# Patient Record
Sex: MALE | Race: WHITE | HISPANIC OR LATINO | Employment: UNEMPLOYED | URBAN - METROPOLITAN AREA
[De-identification: names, ages, dates, MRNs, and addresses within clinical notes are randomized per-mention and may not be internally consistent; named-entity substitution may affect disease eponyms.]

---

## 2024-03-12 ENCOUNTER — APPOINTMENT (OUTPATIENT)
Dept: RADIOLOGY | Facility: MEDICAL CENTER | Age: 44
DRG: 040 | End: 2024-03-12
Attending: NEUROLOGICAL SURGERY
Payer: COMMERCIAL

## 2024-03-12 ENCOUNTER — HOSPITAL ENCOUNTER (INPATIENT)
Facility: MEDICAL CENTER | Age: 44
LOS: 6 days | DRG: 040 | End: 2024-03-18
Attending: STUDENT IN AN ORGANIZED HEALTH CARE EDUCATION/TRAINING PROGRAM | Admitting: STUDENT IN AN ORGANIZED HEALTH CARE EDUCATION/TRAINING PROGRAM
Payer: COMMERCIAL

## 2024-03-12 ENCOUNTER — HOSPITAL ENCOUNTER (OUTPATIENT)
Dept: RADIOLOGY | Facility: MEDICAL CENTER | Age: 44
End: 2024-03-12
Attending: EMERGENCY MEDICINE

## 2024-03-12 ENCOUNTER — APPOINTMENT (OUTPATIENT)
Dept: RADIOLOGY | Facility: MEDICAL CENTER | Age: 44
DRG: 040 | End: 2024-03-12
Attending: STUDENT IN AN ORGANIZED HEALTH CARE EDUCATION/TRAINING PROGRAM
Payer: COMMERCIAL

## 2024-03-12 DIAGNOSIS — I60.9 SAH (SUBARACHNOID HEMORRHAGE) (HCC): ICD-10-CM

## 2024-03-12 DIAGNOSIS — G93.89 BRAIN MASS: ICD-10-CM

## 2024-03-12 PROBLEM — R53.1 WEAKNESS: Status: ACTIVE | Noted: 2024-03-12

## 2024-03-12 PROBLEM — R20.8 DECREASED SENSATION: Status: ACTIVE | Noted: 2024-03-12

## 2024-03-12 LAB
ALBUMIN SERPL BCP-MCNC: 4.3 G/DL (ref 3.2–4.9)
ALBUMIN/GLOB SERPL: 1.4 G/DL
ALP SERPL-CCNC: 109 U/L (ref 30–99)
ALT SERPL-CCNC: 77 U/L (ref 2–50)
ANION GAP SERPL CALC-SCNC: 13 MMOL/L (ref 7–16)
AST SERPL-CCNC: 81 U/L (ref 12–45)
BASOPHILS # BLD AUTO: 1.5 % (ref 0–1.8)
BASOPHILS # BLD: 0.13 K/UL (ref 0–0.12)
BILIRUB SERPL-MCNC: 0.6 MG/DL (ref 0.1–1.5)
BUN SERPL-MCNC: 10 MG/DL (ref 8–22)
CALCIUM ALBUM COR SERPL-MCNC: 8.6 MG/DL (ref 8.5–10.5)
CALCIUM SERPL-MCNC: 8.8 MG/DL (ref 8.5–10.5)
CHLORIDE SERPL-SCNC: 99 MMOL/L (ref 96–112)
CO2 SERPL-SCNC: 24 MMOL/L (ref 20–33)
CREAT SERPL-MCNC: 0.99 MG/DL (ref 0.5–1.4)
EOSINOPHIL # BLD AUTO: 0.27 K/UL (ref 0–0.51)
EOSINOPHIL NFR BLD: 3.2 % (ref 0–6.9)
ERYTHROCYTE [DISTWIDTH] IN BLOOD BY AUTOMATED COUNT: 45.6 FL (ref 35.9–50)
GFR SERPLBLD CREATININE-BSD FMLA CKD-EPI: 98 ML/MIN/1.73 M 2
GLOBULIN SER CALC-MCNC: 3 G/DL (ref 1.9–3.5)
GLUCOSE SERPL-MCNC: 94 MG/DL (ref 65–99)
HCT VFR BLD AUTO: 48.3 % (ref 42–52)
HGB BLD-MCNC: 16.5 G/DL (ref 14–18)
IMM GRANULOCYTES # BLD AUTO: 0.1 K/UL (ref 0–0.11)
IMM GRANULOCYTES NFR BLD AUTO: 1.2 % (ref 0–0.9)
LYMPHOCYTES # BLD AUTO: 2.73 K/UL (ref 1–4.8)
LYMPHOCYTES NFR BLD: 32.5 % (ref 22–41)
MCH RBC QN AUTO: 31.4 PG (ref 27–33)
MCHC RBC AUTO-ENTMCNC: 34.2 G/DL (ref 32.3–36.5)
MCV RBC AUTO: 92 FL (ref 81.4–97.8)
MONOCYTES # BLD AUTO: 0.61 K/UL (ref 0–0.85)
MONOCYTES NFR BLD AUTO: 7.3 % (ref 0–13.4)
NEUTROPHILS # BLD AUTO: 4.57 K/UL (ref 1.82–7.42)
NEUTROPHILS NFR BLD: 54.3 % (ref 44–72)
NRBC # BLD AUTO: 0 K/UL
NRBC BLD-RTO: 0 /100 WBC (ref 0–0.2)
PLATELET # BLD AUTO: 291 K/UL (ref 164–446)
PMV BLD AUTO: 9.8 FL (ref 9–12.9)
POTASSIUM SERPL-SCNC: 3.7 MMOL/L (ref 3.6–5.5)
PROT SERPL-MCNC: 7.3 G/DL (ref 6–8.2)
RBC # BLD AUTO: 5.25 M/UL (ref 4.7–6.1)
SODIUM SERPL-SCNC: 136 MMOL/L (ref 135–145)
WBC # BLD AUTO: 8.4 K/UL (ref 4.8–10.8)

## 2024-03-12 PROCEDURE — 36415 COLL VENOUS BLD VENIPUNCTURE: CPT

## 2024-03-12 PROCEDURE — 99222 1ST HOSP IP/OBS MODERATE 55: CPT | Performed by: STUDENT IN AN ORGANIZED HEALTH CARE EDUCATION/TRAINING PROGRAM

## 2024-03-12 PROCEDURE — 85025 COMPLETE CBC W/AUTO DIFF WBC: CPT

## 2024-03-12 PROCEDURE — 770004 HCHG ROOM/CARE - ONCOLOGY PRIVATE *

## 2024-03-12 PROCEDURE — 80053 COMPREHEN METABOLIC PANEL: CPT

## 2024-03-12 PROCEDURE — A9270 NON-COVERED ITEM OR SERVICE: HCPCS | Performed by: STUDENT IN AN ORGANIZED HEALTH CARE EDUCATION/TRAINING PROGRAM

## 2024-03-12 PROCEDURE — 700117 HCHG RX CONTRAST REV CODE 255: Performed by: STUDENT IN AN ORGANIZED HEALTH CARE EDUCATION/TRAINING PROGRAM

## 2024-03-12 PROCEDURE — 71260 CT THORAX DX C+: CPT

## 2024-03-12 PROCEDURE — 700105 HCHG RX REV CODE 258: Performed by: STUDENT IN AN ORGANIZED HEALTH CARE EDUCATION/TRAINING PROGRAM

## 2024-03-12 PROCEDURE — 70553 MRI BRAIN STEM W/O & W/DYE: CPT

## 2024-03-12 PROCEDURE — A9579 GAD-BASE MR CONTRAST NOS,1ML: HCPCS | Performed by: NEUROLOGICAL SURGERY

## 2024-03-12 PROCEDURE — 700117 HCHG RX CONTRAST REV CODE 255: Performed by: NEUROLOGICAL SURGERY

## 2024-03-12 PROCEDURE — 700102 HCHG RX REV CODE 250 W/ 637 OVERRIDE(OP): Performed by: STUDENT IN AN ORGANIZED HEALTH CARE EDUCATION/TRAINING PROGRAM

## 2024-03-12 RX ORDER — DIAZEPAM 5 MG/ML
5 INJECTION, SOLUTION INTRAMUSCULAR; INTRAVENOUS ONCE
Status: DISCONTINUED | OUTPATIENT
Start: 2024-03-12 | End: 2024-03-12

## 2024-03-12 RX ORDER — SODIUM CHLORIDE 9 MG/ML
INJECTION, SOLUTION INTRAVENOUS CONTINUOUS
Status: ACTIVE | OUTPATIENT
Start: 2024-03-12 | End: 2024-03-12

## 2024-03-12 RX ORDER — LORAZEPAM 1 MG/1
0.5 TABLET ORAL 2 TIMES DAILY PRN
Status: DISCONTINUED | OUTPATIENT
Start: 2024-03-13 | End: 2024-03-18

## 2024-03-12 RX ORDER — ACETAMINOPHEN 325 MG/1
650 TABLET ORAL EVERY 4 HOURS PRN
Status: ON HOLD | COMMUNITY
End: 2024-03-18

## 2024-03-12 RX ORDER — ACETAMINOPHEN 325 MG/1
650 TABLET ORAL EVERY 4 HOURS PRN
Status: DISCONTINUED | OUTPATIENT
Start: 2024-03-12 | End: 2024-03-14

## 2024-03-12 RX ORDER — HYDROCODONE BITARTRATE AND ACETAMINOPHEN 5; 325 MG/1; MG/1
1 TABLET ORAL EVERY 6 HOURS PRN
Status: DISCONTINUED | OUTPATIENT
Start: 2024-03-12 | End: 2024-03-14

## 2024-03-12 RX ADMIN — IOHEXOL 100 ML: 350 INJECTION, SOLUTION INTRAVENOUS at 13:00

## 2024-03-12 RX ADMIN — SODIUM CHLORIDE: 9 INJECTION, SOLUTION INTRAVENOUS at 04:00

## 2024-03-12 RX ADMIN — GADOTERIDOL 20 ML: 279.3 INJECTION, SOLUTION INTRAVENOUS at 19:35

## 2024-03-12 RX ADMIN — HYDROCODONE BITARTRATE AND ACETAMINOPHEN 1 TABLET: 5; 325 TABLET ORAL at 19:59

## 2024-03-12 ASSESSMENT — COGNITIVE AND FUNCTIONAL STATUS - GENERAL
MOBILITY SCORE: 21
DAILY ACTIVITIY SCORE: 24
SUGGESTED CMS G CODE MODIFIER MOBILITY: CJ
SUGGESTED CMS G CODE MODIFIER DAILY ACTIVITY: CH
CLIMB 3 TO 5 STEPS WITH RAILING: A LITTLE
MOVING TO AND FROM BED TO CHAIR: A LITTLE
WALKING IN HOSPITAL ROOM: A LITTLE

## 2024-03-12 ASSESSMENT — ENCOUNTER SYMPTOMS
SPEECH CHANGE: 0
SEIZURES: 0
ORTHOPNEA: 0
EYE REDNESS: 0
HEMOPTYSIS: 0
FEVER: 0
INSOMNIA: 0
HALLUCINATIONS: 0
TINGLING: 0
BLURRED VISION: 0
DOUBLE VISION: 0
NECK PAIN: 0
BACK PAIN: 0
FOCAL WEAKNESS: 1
VOMITING: 0
SPUTUM PRODUCTION: 0
NAUSEA: 0
PHOTOPHOBIA: 0
DIZZINESS: 0
DIARRHEA: 0
SINUS PAIN: 0
SENSORY CHANGE: 1
ABDOMINAL PAIN: 0
TREMORS: 0
SHORTNESS OF BREATH: 0
EYE DISCHARGE: 0
NERVOUS/ANXIOUS: 0
FLANK PAIN: 0
EYE PAIN: 0
CHILLS: 0
LOSS OF CONSCIOUSNESS: 0
HEADACHES: 0
PALPITATIONS: 0
WEAKNESS: 1

## 2024-03-12 ASSESSMENT — LIFESTYLE VARIABLES
TOTAL SCORE: 0
DOES PATIENT WANT TO STOP DRINKING: NO
EVER HAD A DRINK FIRST THING IN THE MORNING TO STEADY YOUR NERVES TO GET RID OF A HANGOVER: NO
SUBSTANCE_ABUSE: 0
TOTAL SCORE: 0
AVERAGE NUMBER OF DAYS PER WEEK YOU HAVE A DRINK CONTAINING ALCOHOL: 1
TOTAL SCORE: 0
CONSUMPTION TOTAL: INCOMPLETE
EVER FELT BAD OR GUILTY ABOUT YOUR DRINKING: NO
HAVE PEOPLE ANNOYED YOU BY CRITICIZING YOUR DRINKING: NO
HAVE YOU EVER FELT YOU SHOULD CUT DOWN ON YOUR DRINKING: NO
ALCOHOL_USE: YES

## 2024-03-12 ASSESSMENT — PAIN DESCRIPTION - PAIN TYPE
TYPE: ACUTE PAIN

## 2024-03-12 ASSESSMENT — PATIENT HEALTH QUESTIONNAIRE - PHQ9
SUM OF ALL RESPONSES TO PHQ9 QUESTIONS 1 AND 2: 0
1. LITTLE INTEREST OR PLEASURE IN DOING THINGS: NOT AT ALL
2. FEELING DOWN, DEPRESSED, IRRITABLE, OR HOPELESS: NOT AT ALL

## 2024-03-12 NOTE — CARE PLAN
The patient is Stable - Low risk of patient condition declining or worsening    Shift Goals  Clinical Goals: MRI, CT  Patient Goals: Determine treatment plan    Progress made toward(s) clinical / shift goals:        Problem: Knowledge Deficit - Standard  Goal: Patient and family/care givers will demonstrate understanding of plan of care, disease process/condition, diagnostic tests and medications  Description: Target End Date:  1-3 days or as soon as patient condition allows    Document in Patient Education    1.  Patient and family/caregiver oriented to unit, equipment, visitation policy and means for communicating concern  2.  Complete/review Learning Assessment  3.  Assess knowledge level of disease process/condition, treatment plan, diagnostic tests and medications  4.  Explain disease process/condition, treatment plan, diagnostic tests and medications  Outcome: Progressing  Note: Patient understands the need for his scans and to remain NPO until his exams. Patient will report any new or worsening numbness or tingling.

## 2024-03-12 NOTE — ASSESSMENT & PLAN NOTE
MRI brain: approximately 31 x 22 x 26 cm sized enhancing lesion in the left cerebellopontine cistern with extension into the IAC. There is mass effect upon the abe.   - Neurosurgery following  - Resume regular diet after surgery  - Pain control post operatively as indicated

## 2024-03-12 NOTE — HOSPITAL COURSE
Porfirio Campoverde is a 44 y.o. male who presented 3/12/2024 as a transfer from outside facility for brain mass.  Patient initially presented to [facility] emergency department complaining of 1 week history of numbness and tingling and weakness in his right upper and lower extremity.  He states that over the last few days, has progressively been getting worse.  Patient also endorses headache that has been on and off.  Denies any dizziness or blurred vision.  He does state that he has decreased hearing in his right ear.     CT head was obtained that showed no acute hemorrhage.  There is a suspected extra-axial mass along the left cerebellopontine angle.  Most likely meningioma.  However aggressive processes such as metastatic attic foci not excluded.  Recommending MRI of the brain     CTA head and neck was also performed, still showing a cerebellopontine angle lesion as described in the CT head.  Could possibly represent an acoustic neuroma as well.

## 2024-03-12 NOTE — PROGRESS NOTES
4 Eyes Skin Assessment Completed by Maia Cavazos, RN and Danii Joseph, MAURICIO.    Head WDL  Ears WDL  Nose WDL  Mouth WDL  Neck WDL  Breast/Chest WDL  Shoulder Blades WDL  Spine WDL  (R) Arm/Elbow/Hand WDL  (L) Arm/Elbow/Hand WDL  Abdomen WDL  Groin WDL  Scrotum/Coccyx/Buttocks WDL  (R) Leg Edema  (L) Leg WDL  (R) Heel/Foot/Toe WDL  (L) Heel/Foot/Toe WDL    Devices In Places Nasal Cannula    Interventions In Place N/A    Possible Skin Injury No    Pictures Uploaded Into Epic N/A  Wound Consult Placed N/A  RN Wound Prevention Protocol Ordered No

## 2024-03-12 NOTE — PROGRESS NOTES
[facility] TRIAGE OFFICER DIRECT ADMISSION REPORT  Transferring facility: [facility]  Transferring physician: Dr. Leon  Chief complaint: Weakness numbness tingling  Pertinent history & patient course: 44-year-old man presented to [facility] due to weakness, numbness/tingling, some hearing difficulties.  Mass at the cerebellar/pontine junction.  No midline shaft and no significant weakness.  Needs transfer for MRI and neurosurgery consult.  Further work up or recommendations per triage officer prior to transfer: None  Consultants called prior to transfer and pertinent input from consultants: None  Patient accepted for transfer: Yes  Consultants to be called upon arrival: Neurosurgery after imaging  Admission status: Inpatient.   Floor requested: [facility]  If ICU transfer, name of intensivist case discussed with and pertinent input from critical care: N/A     Please inform the triage officer upon arrival of the patient to [facility] for assignment of a hospitalist to perform admission.      For any question or concerns regarding the care of this patient, please reach out to the assigned hospita

## 2024-03-12 NOTE — H&P
Hospital Medicine History & Physical Note    Date of Service  3/12/2024    Primary Care Physician  No primary care provider on file.    Code Status  Full Code    Chief Complaint  No chief complaint on file.      History of Presenting Illness  Porfirio Campoverde is a 44 y.o. male who presented 3/12/2024 as a transfer from outside facility for brain mass.  Patient initially presented to [facility] emergency department complaining of 1 week history of numbness and tingling and weakness in his right upper and lower extremity.  He states that over the last few days, has progressively been getting worse.  Patient also endorses headache that has been on and off.  Denies any dizziness or blurred vision.  He does state that he has decreased hearing in his right ear.    CT head was obtained that showed no acute hemorrhage.  There is a suspected extra-axial mass along the left cerebellopontine angle.  Most likely meningioma.  However aggressive processes such as metastatic attic foci not excluded.  Recommending MRI of the brain    CTA head and neck was also performed, still showing a cerebellopontine angle lesion as described in the CT head.  Could possibly represent an acoustic neuroma as well.    I discussed the plan of care with patient.    Review of Systems  Review of Systems   Constitutional:  Negative for chills and fever.   HENT:  Positive for hearing loss. Negative for congestion, ear discharge, ear pain, nosebleeds and sinus pain.    Eyes:  Negative for blurred vision, double vision, photophobia, pain, discharge and redness.   Respiratory:  Negative for hemoptysis, sputum production and shortness of breath.    Cardiovascular:  Negative for chest pain, palpitations and orthopnea.   Gastrointestinal:  Negative for abdominal pain, diarrhea, nausea and vomiting.   Genitourinary:  Negative for dysuria, flank pain, frequency, hematuria and urgency.   Musculoskeletal:  Negative for back pain and neck pain.   Skin:   Negative for itching and rash.   Neurological:  Positive for sensory change, focal weakness and weakness. Negative for dizziness, tingling, tremors, speech change, seizures, loss of consciousness and headaches.   Psychiatric/Behavioral:  Negative for hallucinations and substance abuse. The patient is not nervous/anxious and does not have insomnia.        Past Medical History   has no past medical history on file.    Surgical History   has no past surgical history on file.     Family History  Family history is unknown by patient.   Family history reviewed with patient. There is no family history that is pertinent to the chief complaint.     Social History   reports that he quit smoking 7 days ago. His smoking use included cigarettes. He has never used smokeless tobacco. He reports current alcohol use of about 12.0 oz of alcohol per week. He reports that he does not use drugs.    Allergies  No Known Allergies    Medications  None       Physical Exam  Temp:  [36.5 °C (97.7 °F)] (P) 36.5 °C (97.7 °F)  Pulse:  [63] (P) 63  Resp:  [18] (P) 18  BP: (P) 130/92  SpO2:  [96 %] (P) 96 %  Blood Pressure: (!) (P) 130/92   Temperature: (P) 36.5 °C (97.7 °F)   Pulse: (P) 63   Respiration: (P) 18   Pulse Oximetry: (P) 96 %       Physical Exam  Constitutional:       General: He is not in acute distress.     Appearance: Normal appearance. He is normal weight. He is not ill-appearing, toxic-appearing or diaphoretic.   HENT:      Head: Normocephalic and atraumatic.      Mouth/Throat:      Mouth: Mucous membranes are moist.   Eyes:      Pupils: Pupils are equal, round, and reactive to light.   Cardiovascular:      Rate and Rhythm: Normal rate and regular rhythm.      Pulses: Normal pulses.      Heart sounds: Normal heart sounds. No murmur heard.     No friction rub. No gallop.   Pulmonary:      Effort: Pulmonary effort is normal. No respiratory distress.      Breath sounds: Normal breath sounds. No stridor. No wheezing, rhonchi or  "rales.   Chest:      Chest wall: No tenderness.   Abdominal:      General: There is no distension.      Palpations: There is no mass.      Tenderness: There is no abdominal tenderness. There is no right CVA tenderness, left CVA tenderness, guarding or rebound.      Hernia: No hernia is present.   Musculoskeletal:         General: No swelling, tenderness, deformity or signs of injury.      Right lower leg: No edema.      Left lower leg: No edema.      Comments: Strength 4-5 in the right upper and lower extremity.  Sensation decreased on the entire right side as well.   Skin:     General: Skin is warm and dry.      Capillary Refill: Capillary refill takes less than 2 seconds.      Coloration: Skin is not jaundiced or pale.      Findings: No bruising, erythema, lesion or rash.   Neurological:      General: No focal deficit present.      Mental Status: He is alert and oriented to person, place, and time. Mental status is at baseline.      Cranial Nerves: No cranial nerve deficit.      Sensory: Sensory deficit present.      Motor: Weakness present.      Coordination: Coordination normal.      Gait: Gait normal.      Deep Tendon Reflexes: Reflexes normal.   Psychiatric:         Mood and Affect: Mood normal.         Laboratory:          No results for input(s): \"ALTSGPT\", \"ASTSGOT\", \"ALKPHOSPHAT\", \"TBILIRUBIN\", \"DBILIRUBIN\", \"GAMMAGT\", \"AMYLASE\", \"LIPASE\", \"ALB\", \"PREALBUMIN\", \"GLUCOSE\" in the last 72 hours.      No results for input(s): \"NTPROBNP\" in the last 72 hours.      No results for input(s): \"TROPONINT\" in the last 72 hours.    Imaging:  OUTSIDE IMAGES-CT HEAD   Final Result      MR-BRAIN-W/O    (Results Pending)   CT-CHEST,ABDOMEN,PELVIS WITH    (Results Pending)       no X-Ray or EKG requiring interpretation    Assessment/Plan:  Justification for Admission Status  I anticipate this patient will require at least two midnights for appropriate medical management, necessitating inpatient admission because management " of the brain mass, requiring neurosurgery evaluation    Patient will need a Med/Surg bed on ONCOLOGY service .  The need is secondary to brain mass by neurosurgery evaluation.    * Brain mass- (present on admission)  Assessment & Plan  CT head was obtained that showed no acute hemorrhage.  There is a suspected extra-axial mass along the left cerebellopontine angle.  Most likely meningioma.  However aggressive processes such as metastatic attic foci not excluded.  Recommending MRI of the brain    CTA head and neck was also performed, still showing a cerebellopontine angle lesion as described in the CT head.  Could possibly represent an acoustic neuroma as well.    Follow-up MRI brain  Follow-up CT chest abdomen pelvis  Day team to consult neurosurgery    Decreased sensation  Assessment & Plan  See plan above     Weakness  Assessment & Plan  See plan above        VTE prophylaxis: SCDs/TEDs

## 2024-03-13 LAB
ABO + RH BLD: NORMAL
ABO GROUP BLD: NORMAL
ALBUMIN SERPL BCP-MCNC: 4.2 G/DL (ref 3.2–4.9)
ALBUMIN/GLOB SERPL: 1.3 G/DL
ALP SERPL-CCNC: 101 U/L (ref 30–99)
ALT SERPL-CCNC: 66 U/L (ref 2–50)
ANION GAP SERPL CALC-SCNC: 10 MMOL/L (ref 7–16)
APTT PPP: 31.2 SEC (ref 24.7–36)
AST SERPL-CCNC: 61 U/L (ref 12–45)
BILIRUB SERPL-MCNC: 1 MG/DL (ref 0.1–1.5)
BLD GP AB SCN SERPL QL: NORMAL
BUN SERPL-MCNC: 14 MG/DL (ref 8–22)
CALCIUM ALBUM COR SERPL-MCNC: 9.1 MG/DL (ref 8.5–10.5)
CALCIUM SERPL-MCNC: 9.3 MG/DL (ref 8.5–10.5)
CHLORIDE SERPL-SCNC: 98 MMOL/L (ref 96–112)
CO2 SERPL-SCNC: 28 MMOL/L (ref 20–33)
CREAT SERPL-MCNC: 1.08 MG/DL (ref 0.5–1.4)
ERYTHROCYTE [DISTWIDTH] IN BLOOD BY AUTOMATED COUNT: 46.8 FL (ref 35.9–50)
GFR SERPLBLD CREATININE-BSD FMLA CKD-EPI: 88 ML/MIN/1.73 M 2
GLOBULIN SER CALC-MCNC: 3.3 G/DL (ref 1.9–3.5)
GLUCOSE SERPL-MCNC: 100 MG/DL (ref 65–99)
HCT VFR BLD AUTO: 52 % (ref 42–52)
HGB BLD-MCNC: 17 G/DL (ref 14–18)
INR PPP: 0.87 (ref 0.87–1.13)
MCH RBC QN AUTO: 30.9 PG (ref 27–33)
MCHC RBC AUTO-ENTMCNC: 32.7 G/DL (ref 32.3–36.5)
MCV RBC AUTO: 94.5 FL (ref 81.4–97.8)
PLATELET # BLD AUTO: 286 K/UL (ref 164–446)
PMV BLD AUTO: 9.5 FL (ref 9–12.9)
POTASSIUM SERPL-SCNC: 4.1 MMOL/L (ref 3.6–5.5)
PROT SERPL-MCNC: 7.5 G/DL (ref 6–8.2)
PROTHROMBIN TIME: 11.9 SEC (ref 12–14.6)
RBC # BLD AUTO: 5.5 M/UL (ref 4.7–6.1)
RH BLD: NORMAL
SODIUM SERPL-SCNC: 136 MMOL/L (ref 135–145)
WBC # BLD AUTO: 7.6 K/UL (ref 4.8–10.8)

## 2024-03-13 PROCEDURE — 36415 COLL VENOUS BLD VENIPUNCTURE: CPT

## 2024-03-13 PROCEDURE — A9270 NON-COVERED ITEM OR SERVICE: HCPCS | Performed by: STUDENT IN AN ORGANIZED HEALTH CARE EDUCATION/TRAINING PROGRAM

## 2024-03-13 PROCEDURE — 770004 HCHG ROOM/CARE - ONCOLOGY PRIVATE *

## 2024-03-13 PROCEDURE — 86900 BLOOD TYPING SEROLOGIC ABO: CPT

## 2024-03-13 PROCEDURE — 99232 SBSQ HOSP IP/OBS MODERATE 35: CPT | Performed by: STUDENT IN AN ORGANIZED HEALTH CARE EDUCATION/TRAINING PROGRAM

## 2024-03-13 PROCEDURE — 700102 HCHG RX REV CODE 250 W/ 637 OVERRIDE(OP): Performed by: STUDENT IN AN ORGANIZED HEALTH CARE EDUCATION/TRAINING PROGRAM

## 2024-03-13 PROCEDURE — 85730 THROMBOPLASTIN TIME PARTIAL: CPT

## 2024-03-13 PROCEDURE — 85027 COMPLETE CBC AUTOMATED: CPT

## 2024-03-13 PROCEDURE — 80053 COMPREHEN METABOLIC PANEL: CPT

## 2024-03-13 PROCEDURE — 85610 PROTHROMBIN TIME: CPT

## 2024-03-13 PROCEDURE — 86850 RBC ANTIBODY SCREEN: CPT

## 2024-03-13 PROCEDURE — 86901 BLOOD TYPING SEROLOGIC RH(D): CPT

## 2024-03-13 RX ADMIN — ACETAMINOPHEN 650 MG: 325 TABLET, FILM COATED ORAL at 10:01

## 2024-03-13 ASSESSMENT — ENCOUNTER SYMPTOMS
FEVER: 0
VOMITING: 0
SHORTNESS OF BREATH: 0
ABDOMINAL PAIN: 0
COUGH: 0
TINGLING: 1
NAUSEA: 0
WEAKNESS: 1

## 2024-03-13 ASSESSMENT — PAIN DESCRIPTION - PAIN TYPE
TYPE: ACUTE PAIN
TYPE: ACUTE PAIN

## 2024-03-13 NOTE — PROGRESS NOTES
Hospital Medicine Daily Progress Note    Date of Service  3/13/2024    Chief Complaint  Porfirio Campoverde is a 44 y.o. male admitted 3/12/2024 with right sided weakness.    Hospital Course  Porfirio Campoverde is a 44 y.o. male who presented 3/12/2024 as a transfer from outside facility for brain mass.  Patient initially presented to [facility] emergency department complaining of 1 week history of numbness and tingling and weakness in his right upper and lower extremity.  He states that over the last few days, has progressively been getting worse.  Patient also endorses headache that has been on and off.  Denies any dizziness or blurred vision.  He does state that he has decreased hearing in his right ear.     CT head was obtained that showed no acute hemorrhage.  There is a suspected extra-axial mass along the left cerebellopontine angle.  Most likely meningioma.  However aggressive processes such as metastatic attic foci not excluded.  Recommending MRI of the brain     CTA head and neck was also performed, still showing a cerebellopontine angle lesion as described in the CT head.  Could possibly represent an acoustic neuroma as well.    Interval Problem Update  3/13: MRI brain completed yesterday. Acoustic schwannoma visualized. Neurosurgery recommending surgical intervention. Planned for 3/14. NPO at [facility].     I have discussed this patient's plan of care and discharge plan at IDT rounds today with Case Management, Nursing, Nursing leadership, and other members of the IDT team.    Consultants/Specialty  neurosurgery    Code Status  Full Code    Disposition  The patient is not medically cleared for discharge to home or a post-acute facility.  Anticipate discharge to: home with close outpatient follow-up    I have placed the appropriate orders for post-discharge needs.    Review of Systems  Review of Systems   Constitutional:  Negative for fever.   HENT:  Positive for hearing loss.    Respiratory:  Negative for  cough and shortness of breath.    Cardiovascular:  Negative for chest pain.   Gastrointestinal:  Negative for abdominal pain, nausea and vomiting.   Neurological:  Positive for tingling and weakness.        Physical Exam  Temp:  [36.4 °C (97.5 °F)-36.8 °C (98.3 °F)] 36.4 °C (97.6 °F)  Pulse:  [58-76] 58  Resp:  [17-20] 17  BP: ()/(58-78) 92/58  SpO2:  [93 %-96 %] 95 %    Physical Exam  Vitals and nursing note reviewed.   Constitutional:       General: He is not in acute distress.     Appearance: Normal appearance. He is not ill-appearing.   HENT:      Head: Normocephalic.      Mouth/Throat:      Mouth: Mucous membranes are moist.      Pharynx: Oropharynx is clear.   Cardiovascular:      Rate and Rhythm: Normal rate and regular rhythm.      Heart sounds: No murmur heard.  Pulmonary:      Effort: Pulmonary effort is normal. No respiratory distress.      Breath sounds: Normal breath sounds. No wheezing.   Abdominal:      General: Abdomen is flat. Bowel sounds are normal. There is no distension.      Palpations: Abdomen is soft.      Tenderness: There is no abdominal tenderness.   Musculoskeletal:         General: No swelling. Normal range of motion.      Cervical back: Normal range of motion. No tenderness.   Skin:     General: Skin is warm and dry.   Neurological:      Mental Status: He is alert and oriented to person, place, and time.      Cranial Nerves: No cranial nerve deficit.      Sensory: Sensory deficit present.      Motor: Weakness present.      Comments: Right side diminished hearing  Right UE and LE weakness 3/5  Right UE and LE loss of sensation    Psychiatric:         Mood and Affect: Mood normal.         Behavior: Behavior normal.         Fluids    Intake/Output Summary (Last 24 hours) at 3/13/2024 1224  Last data filed at 3/13/2024 1000  Gross per 24 hour   Intake 240 ml   Output --   Net 240 ml       Laboratory  Recent Labs     03/12/24  0414 03/13/24  0715   WBC 8.4 7.6   RBC 5.25 5.50    HEMOGLOBIN 16.5 17.0   HEMATOCRIT 48.3 52.0   MCV 92.0 94.5   MCH 31.4 30.9   MCHC 34.2 32.7   RDW 45.6 46.8   PLATELETCT 291 286   MPV 9.8 9.5     Recent Labs     03/12/24  0414 03/13/24  0715   SODIUM 136 136   POTASSIUM 3.7 4.1   CHLORIDE 99 98   CO2 24 28   GLUCOSE 94 100*   BUN 10 14   CREATININE 0.99 1.08   CALCIUM 8.8 9.3                   Imaging  MR-BRAIN-WITH & W/O   Final Result   Addendum (preliminary) 1 of 1      1.  There is an approximately 31 x 22 x 26 cm sized enhancing lesion in    the left cerebellopontine angle with extension into the IAC consistent    with acoustic schwannoma. There is no labyrinthine extension. There is    mass effect upon the abe.   2.  No acute infarct or hemorrhage.      Final      1.  There is an approximately 31 x 22 x 26 cm sized enhancing lesion in the left cerebellopontine angle with extension into the IAC. There is mass effect upon the abe.   2.  No acute infarct or hemorrhage.         CT-CHEST,ABDOMEN,PELVIS WITH   Final Result      No mass or adenopathy detected.      OUTSIDE IMAGES-CT HEAD   Final Result             Assessment/Plan  * Brain mass- (present on admission)  Assessment & Plan  MRI brain: approximately 31 x 22 x 26 cm sized enhancing lesion in the left cerebellopontine cistern with extension into the IAC. There is mass effect upon the abe.   - Neurosurgery following  - NPO at [facility] for surgical intervention 3/14    Decreased sensation  Assessment & Plan  See plan above     Weakness  Assessment & Plan  See plan above         VTE prophylaxis:   SCDs/TEDs   pharmacologic prophylaxis contraindicated due to surgery 3/14      I have performed a physical exam and reviewed and updated ROS and Plan today (3/13/2024). In review of yesterday's note (3/12/2024), there are no changes except as documented above.

## 2024-03-13 NOTE — CARE PLAN
The patient is Stable - Low risk of patient condition declining or worsening    Shift Goals  Clinical Goals: Pain control, Determine treatment plan  Patient Goals: Rest  Family Goals: rest    Progress made toward(s) clinical / shift goals:        Problem: Knowledge Deficit - Standard  Goal: Patient and family/care givers will demonstrate understanding of plan of care, disease process/condition, diagnostic tests and medications  Description: Target End Date:  1-3 days or as soon as patient condition allows    Document in Patient Education    1.  Patient and family/caregiver oriented to unit, equipment, visitation policy and means for communicating concern  2.  Complete/review Learning Assessment  3.  Assess knowledge level of disease process/condition, treatment plan, diagnostic tests and medications  4.  Explain disease process/condition, treatment plan, diagnostic tests and medications  Outcome: Progressing  Note: Patient understands that he and his family need to come to a decision about plan of care. Patient will decide what he would like to.      Problem: Pain - Standard  Goal: Alleviation of pain or a reduction in pain to the patient’s comfort goal  Description: Target End Date:  Prior to discharge or change in level of care    Document on Vitals flowsheet    1.  Document pain using the appropriate pain scale per order or unit policy  2.  Educate and implement non-pharmacologic comfort measures (i.e. relaxation, distraction, massage, cold/heat therapy, etc.)  3.  Pain management medications as ordered  4.  Reassess pain after pain med administration per policy  5.  If opiods administered assess patient's response to pain medication is appropriate per POSS sedation scale  6.  Follow pain management plan developed in collaboration with patient and interdisciplinary team (including palliative care or pain specialists if applicable)  Outcome: Progressing  Note: Patient will continue to rate pain using a scale of  1-10.

## 2024-03-13 NOTE — CONSULTS
Neurosurgery Consult Note    Patient: Porfirio Campoverde MRN: [id number]    Date of Consultation: 3/13/2024    Reason for Consultation: brain lesion    Referring Physician: Dr. Winnie Obrien MD Hospital Medicine    Chief Complaint: numbness    History of Present Illness:  The patient is a 44 y.o. male presenting with acute onset of right facial, body, arm and leg numbness, weakness for 1 week. He has also been reporting headaches for over a month. He presented to [facility] where CT head revealed a CP angle mass, and transferred to [facility]. Neurosurgery consulted.    He reports decreased hearing on the right, longstanding. No left facial weakness, numbness, hearing difficulty.      Medications:  Current Facility-Administered Medications   Medication Dose Route Frequency Provider Last Rate Last Admin    acetaminophen (Tylenol) tablet 650 mg  650 mg Oral Q4HRS PRN Winnie Obrien M.D.        HYDROcodone-acetaminophen (Norco) 5-325 MG per tablet 1 Tablet  1 Tablet Oral Q6HRS PRN Winnie Obrien M.D.   1 Tablet at 24    LORazepam (Ativan) tablet 0.5 mg  0.5 mg Oral BID PRN SKYLER Palacios.P.RFREDDIE           Allergies:  No Known Allergies    Past Medical History:  No past medical history on file.    Past Surgical History:  No past surgical history on file.    Family History:  Family History   Family history unknown: Yes       Social History:  Social History     Socioeconomic History    Marital status:      Spouse name: Not on file    Number of children: Not on file    Years of education: Not on file    Highest education level: Not on file   Occupational History    Not on file   Tobacco Use    Smoking status: Former     Current packs/day: 0.00     Types: Cigarettes     Quit date: 3/5/2024     Years since quittin.0    Smokeless tobacco: Never   Vaping Use    Vaping Use: Never used   Substance and Sexual Activity    Alcohol use: Yes     Alcohol/week: 12.0 oz     Types: 10 Cans of beer, 10 Shots of  liquor per week    Drug use: Never    Sexual activity: Not on file   Other Topics Concern    Primary/coprimary nurse & associates No    Family contact information No    OK to release patient information to the following No    Patient preferred routine/Privacy concerns No    Patient likes and dislikes No    Participating In Research Study No    Miscellaneous No   Social History Narrative    Not on file     Social Determinants of Health     Financial Resource Strain: Not on file   Food Insecurity: Not on file   Transportation Needs: Not on file   Physical Activity: Not on file   Stress: Not on file   Social Connections: Not on file   Intimate Partner Violence: Not on file   Housing Stability: Not on file       Physical Examination:  Vitals:    24 0421   BP: 99/63   Pulse: 61   Resp: 18   Temp: 36.4 °C (97.5 °F)   SpO2: 93%     Sitting in a chair, no acute distress    Neurological  Eye Openin Eyes open spontaneously Motor Response: 6 Follows commands Verbal Response: 5 Oriented to person, place, and time Total: 15  Awake, alert, oriented to person, place and time.  Pupils equally round and reactive to light, 4 to 3mm.  Extraocular movements full.  Face symmetric, HB 1.  Palate rises symmetrically.  Tongue is midline.  Shoulder shrug 5/5.  Finger-to-nose dysmetric on Right arm.  Patient moves all 4 extremities with full strength equally.  Sensation intact to light touch in all 4 extremities.  Decreased hearing to finger rub on the right, able to hear spoken words on the right  Decreased facial sensation on the right  +right pronator drift, mild  4+ right hand , biceps, triceps, deltoid  4+ right IP, quad, TA, EHL, gastrocnemius      Labs:  Recent Labs     244   WBC 8.4   RBC 5.25   HEMOGLOBIN 16.5   HEMATOCRIT 48.3   MCV 92.0   MCH 31.4   MCHC 34.2   RDW 45.6   PLATELETCT 291   MPV 9.8     Recent Labs     244   SODIUM 136   POTASSIUM 3.7   CHLORIDE 99   CO2 24   GLUCOSE 94   BUN 10    CREATININE 0.99   CALCIUM 8.8               Intake/Output       None            Imaging:    MRI brain with and without contrast dated 3/12/2024 was independently reviewed in detail,  and my interpretation is as follows. 1.  There is an approximately 31 x 22 x 26 cm sized enhancing lesion in the left cerebellopontine angle with extension into the IAC. There is mass effect upon the abe.  2.  No acute infarct or hemorrhage.    Assessment and Plan:    Porfirio Campoverde is a 44 y.o. 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 His symptoms are acute with right-sided numbness and weakness and unexpected right sided hearing loss. I had a long conversation with him and his wife about his neuro imaging findings, symptoms and recommended plan of care. I told them that the tumor likely represents a benign, slow growing tumor. I would have expected some left sided hearing loss. It's possible the tumor is causing his right sided numbness and weakness, though at this size of tumor this is not as common, and doesn't happen acutely. I discussed that given the tumor size and his young age, surgical resection is recommended. The indications, benefits, alternatives and risks to the procedure were discussed with the patient, which included but were not limited to bleeding, infection, stroke, injury to nearby nerves and vessels, facial weakness temporary or permanent, left hearing loss, left facial numbness, cerebrospinal fluid leak, new temporary or permanent motor weakness or vision changes, difficulty swallowing, hoarseness, worsened imbalance or discoordination, coma and rarely, even death.  I gave them the option of discharging and following up locally with a neurosurgeon in [place] given his [employer] insurance versus performing the surgery inpatient this week. They would like to consider this option and will decide  today.    Recommendations:    Thank you for this consult. Please call with questions.    Gita Villarreal M.D.  [facility]  [address] [place] [address]  [phone]    A total of 55 minutes were spent in the evaluation, examination, coordination of care, review of labs and imaging of this patient. I spent >50% of time face-to-face on patient counseling.

## 2024-03-13 NOTE — CARE PLAN
The patient is Watcher - Medium risk of patient condition declining or worsening    Shift Goals  Clinical Goals: pain control, decrease anxiety, rest  Patient Goals: pain control, rest  Family Goals: rest    Progress made toward(s) clinical / shift goals:    Problem: Knowledge Deficit - Standard  Goal: Patient and family/care givers will demonstrate understanding of plan of care, disease process/condition, diagnostic tests and medications  Outcome: Progressing  Note: Pt educated on plan of care, pt verbalizes understanding and agrees to comply.     Problem: Pain - Standard  Goal: Alleviation of pain or a reduction in pain to the patient’s comfort goal  Outcome: Progressing  Note: Pain assessed using 0-10 pain scale, pt medicated per MAR.        Patient is not progressing towards the following goals:

## 2024-03-14 ENCOUNTER — ANESTHESIA EVENT (OUTPATIENT)
Dept: SURGERY | Facility: MEDICAL CENTER | Age: 44
DRG: 040 | End: 2024-03-14
Payer: COMMERCIAL

## 2024-03-14 ENCOUNTER — APPOINTMENT (OUTPATIENT)
Dept: RADIOLOGY | Facility: MEDICAL CENTER | Age: 44
DRG: 040 | End: 2024-03-14
Payer: COMMERCIAL

## 2024-03-14 ENCOUNTER — ANESTHESIA (OUTPATIENT)
Dept: SURGERY | Facility: MEDICAL CENTER | Age: 44
DRG: 040 | End: 2024-03-14
Payer: COMMERCIAL

## 2024-03-14 PROBLEM — Z72.0 TOBACCO ABUSE: Status: ACTIVE | Noted: 2024-03-14

## 2024-03-14 PROBLEM — Z98.890 STATUS POST CRANIOTOMY: Status: ACTIVE | Noted: 2024-03-14

## 2024-03-14 PROCEDURE — 700111 HCHG RX REV CODE 636 W/ 250 OVERRIDE (IP)

## 2024-03-14 PROCEDURE — 700102 HCHG RX REV CODE 250 W/ 637 OVERRIDE(OP)

## 2024-03-14 PROCEDURE — 160035 HCHG PACU - 1ST 60 MINS PHASE I: Performed by: NEUROLOGICAL SURGERY

## 2024-03-14 PROCEDURE — 95940 IONM IN OPERATNG ROOM 15 MIN: CPT | Performed by: NEUROLOGICAL SURGERY

## 2024-03-14 PROCEDURE — 160041 HCHG SURGERY MINUTES - EA ADDL 1 MIN LEVEL 4: Performed by: NEUROLOGICAL SURGERY

## 2024-03-14 PROCEDURE — 99292 CRITICAL CARE ADDL 30 MIN: CPT | Performed by: INTERNAL MEDICINE

## 2024-03-14 PROCEDURE — 700111 HCHG RX REV CODE 636 W/ 250 OVERRIDE (IP): Performed by: NEUROLOGICAL SURGERY

## 2024-03-14 PROCEDURE — 160009 HCHG ANES TIME/MIN: Performed by: NEUROLOGICAL SURGERY

## 2024-03-14 PROCEDURE — 700111 HCHG RX REV CODE 636 W/ 250 OVERRIDE (IP): Mod: JZ | Performed by: INTERNAL MEDICINE

## 2024-03-14 PROCEDURE — 00BK0ZZ EXCISION OF TRIGEMINAL NERVE, OPEN APPROACH: ICD-10-PCS | Performed by: NEUROLOGICAL SURGERY

## 2024-03-14 PROCEDURE — 700111 HCHG RX REV CODE 636 W/ 250 OVERRIDE (IP): Performed by: ANESTHESIOLOGY

## 2024-03-14 PROCEDURE — 700102 HCHG RX REV CODE 250 W/ 637 OVERRIDE(OP): Performed by: NEUROLOGICAL SURGERY

## 2024-03-14 PROCEDURE — 700102 HCHG RX REV CODE 250 W/ 637 OVERRIDE(OP): Performed by: STUDENT IN AN ORGANIZED HEALTH CARE EDUCATION/TRAINING PROGRAM

## 2024-03-14 PROCEDURE — 88331 PATH CONSLTJ SURG 1 BLK 1SPC: CPT

## 2024-03-14 PROCEDURE — 99291 CRITICAL CARE FIRST HOUR: CPT | Performed by: INTERNAL MEDICINE

## 2024-03-14 PROCEDURE — 700105 HCHG RX REV CODE 258: Performed by: INTERNAL MEDICINE

## 2024-03-14 PROCEDURE — A9270 NON-COVERED ITEM OR SERVICE: HCPCS | Performed by: NEUROLOGICAL SURGERY

## 2024-03-14 PROCEDURE — 700101 HCHG RX REV CODE 250: Performed by: INTERNAL MEDICINE

## 2024-03-14 PROCEDURE — 160029 HCHG SURGERY MINUTES - 1ST 30 MINS LEVEL 4: Performed by: NEUROLOGICAL SURGERY

## 2024-03-14 PROCEDURE — 95938 SOMATOSENSORY TESTING: CPT | Performed by: NEUROLOGICAL SURGERY

## 2024-03-14 PROCEDURE — A9270 NON-COVERED ITEM OR SERVICE: HCPCS

## 2024-03-14 PROCEDURE — 700111 HCHG RX REV CODE 636 W/ 250 OVERRIDE (IP): Performed by: INTERNAL MEDICINE

## 2024-03-14 PROCEDURE — 95937 NEUROMUSCULAR JUNCTION TEST: CPT | Performed by: NEUROLOGICAL SURGERY

## 2024-03-14 PROCEDURE — A9270 NON-COVERED ITEM OR SERVICE: HCPCS | Performed by: STUDENT IN AN ORGANIZED HEALTH CARE EDUCATION/TRAINING PROGRAM

## 2024-03-14 PROCEDURE — 110454 HCHG SHELL REV 250: Performed by: NEUROLOGICAL SURGERY

## 2024-03-14 PROCEDURE — 95870 NDL EMG LMTD STD MUSC 1 XTR: CPT | Performed by: NEUROLOGICAL SURGERY

## 2024-03-14 PROCEDURE — 700105 HCHG RX REV CODE 258

## 2024-03-14 PROCEDURE — 160048 HCHG OR STATISTICAL LEVEL 1-5: Performed by: NEUROLOGICAL SURGERY

## 2024-03-14 PROCEDURE — 88341 IMHCHEM/IMCYTCHM EA ADD ANTB: CPT

## 2024-03-14 PROCEDURE — A9270 NON-COVERED ITEM OR SERVICE: HCPCS | Performed by: NURSE PRACTITIONER

## 2024-03-14 PROCEDURE — 4A1004G MONITORING OF CENTRAL NERVOUS ELECTRICAL ACTIVITY, INTRAOPERATIVE, OPEN APPROACH: ICD-10-PCS | Performed by: NEUROLOGICAL SURGERY

## 2024-03-14 PROCEDURE — 160002 HCHG RECOVERY MINUTES (STAT): Performed by: NEUROLOGICAL SURGERY

## 2024-03-14 PROCEDURE — 770022 HCHG ROOM/CARE - ICU (200)

## 2024-03-14 PROCEDURE — 88342 IMHCHEM/IMCYTCHM 1ST ANTB: CPT

## 2024-03-14 PROCEDURE — 700101 HCHG RX REV CODE 250: Performed by: NEUROLOGICAL SURGERY

## 2024-03-14 PROCEDURE — 99232 SBSQ HOSP IP/OBS MODERATE 35: CPT | Performed by: STUDENT IN AN ORGANIZED HEALTH CARE EDUCATION/TRAINING PROGRAM

## 2024-03-14 PROCEDURE — 88307 TISSUE EXAM BY PATHOLOGIST: CPT | Mod: 59

## 2024-03-14 PROCEDURE — C1713 ANCHOR/SCREW BN/BN,TIS/BN: HCPCS | Performed by: NEUROLOGICAL SURGERY

## 2024-03-14 PROCEDURE — 95867 NDL EMG CRANIAL NRV MUSC UNI: CPT | Performed by: NEUROLOGICAL SURGERY

## 2024-03-14 PROCEDURE — 92653 AEP NEURODIAGNOSTIC I&R: CPT | Performed by: NEUROLOGICAL SURGERY

## 2024-03-14 PROCEDURE — 110371 HCHG SHELL REV 272: Performed by: NEUROLOGICAL SURGERY

## 2024-03-14 PROCEDURE — 95955 EEG DURING SURGERY: CPT | Performed by: NEUROLOGICAL SURGERY

## 2024-03-14 PROCEDURE — 700111 HCHG RX REV CODE 636 W/ 250 OVERRIDE (IP): Mod: JZ

## 2024-03-14 PROCEDURE — 700102 HCHG RX REV CODE 250 W/ 637 OVERRIDE(OP): Performed by: NURSE PRACTITIONER

## 2024-03-14 PROCEDURE — 70450 CT HEAD/BRAIN W/O DYE: CPT

## 2024-03-14 DEVICE — GRAFT DURAMATRIX ONLAY PLUS 1IN X 3IN OR 2.75CM X 7.5CM: Type: IMPLANTABLE DEVICE | Status: FUNCTIONAL

## 2024-03-14 DEVICE — PLATE NC BURR HOLE COVER FOR SHUNT 20MM (6NCX4=24): Type: IMPLANTABLE DEVICE | Status: FUNCTIONAL

## 2024-03-14 DEVICE — PLATE NC DOGBONE 2-HOLE RIGID GOLD 0.6MM (6NCX4=24): Type: IMPLANTABLE DEVICE | Status: FUNCTIONAL

## 2024-03-14 DEVICE — SCREW STRYK NC 1.5X4MM (6NCX40=240) CONSIGNED QTY 240 PRE-LOAD 80/PK: Type: IMPLANTABLE DEVICE | Status: FUNCTIONAL

## 2024-03-14 RX ORDER — DEXAMETHASONE SODIUM PHOSPHATE 4 MG/ML
INJECTION, SOLUTION INTRA-ARTICULAR; INTRALESIONAL; INTRAMUSCULAR; INTRAVENOUS; SOFT TISSUE PRN
Status: DISCONTINUED | OUTPATIENT
Start: 2024-03-14 | End: 2024-03-14 | Stop reason: SURG

## 2024-03-14 RX ORDER — PHENYLEPHRINE HYDROCHLORIDE 10 MG/ML
INJECTION, SOLUTION INTRAMUSCULAR; INTRAVENOUS; SUBCUTANEOUS PRN
Status: DISCONTINUED | OUTPATIENT
Start: 2024-03-14 | End: 2024-03-14 | Stop reason: SURG

## 2024-03-14 RX ORDER — LIDOCAINE HYDROCHLORIDE 20 MG/ML
INJECTION, SOLUTION EPIDURAL; INFILTRATION; INTRACAUDAL; PERINEURAL PRN
Status: DISCONTINUED | OUTPATIENT
Start: 2024-03-14 | End: 2024-03-14 | Stop reason: SURG

## 2024-03-14 RX ORDER — HYDRALAZINE HYDROCHLORIDE 20 MG/ML
5 INJECTION INTRAMUSCULAR; INTRAVENOUS
Status: DISCONTINUED | OUTPATIENT
Start: 2024-03-14 | End: 2024-03-14 | Stop reason: HOSPADM

## 2024-03-14 RX ORDER — HYDROMORPHONE HYDROCHLORIDE 1 MG/ML
0.1 INJECTION, SOLUTION INTRAMUSCULAR; INTRAVENOUS; SUBCUTANEOUS
Status: DISCONTINUED | OUTPATIENT
Start: 2024-03-14 | End: 2024-03-14 | Stop reason: HOSPADM

## 2024-03-14 RX ORDER — BACITRACIN ZINC 500 [USP'U]/G
OINTMENT TOPICAL 2 TIMES DAILY
Status: DISCONTINUED | OUTPATIENT
Start: 2024-03-14 | End: 2024-03-18 | Stop reason: HOSPADM

## 2024-03-14 RX ORDER — OXYCODONE HCL 5 MG/5 ML
5 SOLUTION, ORAL ORAL
Status: DISCONTINUED | OUTPATIENT
Start: 2024-03-14 | End: 2024-03-14 | Stop reason: HOSPADM

## 2024-03-14 RX ORDER — DIPHENHYDRAMINE HYDROCHLORIDE 50 MG/ML
12.5 INJECTION INTRAMUSCULAR; INTRAVENOUS
Status: DISCONTINUED | OUTPATIENT
Start: 2024-03-14 | End: 2024-03-14 | Stop reason: HOSPADM

## 2024-03-14 RX ORDER — HALOPERIDOL 5 MG/ML
1 INJECTION INTRAMUSCULAR
Status: DISCONTINUED | OUTPATIENT
Start: 2024-03-14 | End: 2024-03-14 | Stop reason: HOSPADM

## 2024-03-14 RX ORDER — CEFAZOLIN SODIUM 1 G/3ML
INJECTION, POWDER, FOR SOLUTION INTRAMUSCULAR; INTRAVENOUS
Status: DISCONTINUED | OUTPATIENT
Start: 2024-03-14 | End: 2024-03-14 | Stop reason: HOSPADM

## 2024-03-14 RX ORDER — ONDANSETRON 2 MG/ML
4 INJECTION INTRAMUSCULAR; INTRAVENOUS
Status: DISCONTINUED | OUTPATIENT
Start: 2024-03-14 | End: 2024-03-14 | Stop reason: HOSPADM

## 2024-03-14 RX ORDER — DIPHENHYDRAMINE HYDROCHLORIDE 50 MG/ML
25 INJECTION INTRAMUSCULAR; INTRAVENOUS EVERY 6 HOURS PRN
Status: DISCONTINUED | OUTPATIENT
Start: 2024-03-14 | End: 2024-03-18

## 2024-03-14 RX ORDER — OXYCODONE HCL 5 MG/5 ML
10 SOLUTION, ORAL ORAL
Status: DISCONTINUED | OUTPATIENT
Start: 2024-03-14 | End: 2024-03-14 | Stop reason: HOSPADM

## 2024-03-14 RX ORDER — MANNITOL 250 MG/ML
INJECTION, SOLUTION INTRAVENOUS PRN
Status: DISCONTINUED | OUTPATIENT
Start: 2024-03-14 | End: 2024-03-14 | Stop reason: SURG

## 2024-03-14 RX ORDER — ONDANSETRON 2 MG/ML
INJECTION INTRAMUSCULAR; INTRAVENOUS PRN
Status: DISCONTINUED | OUTPATIENT
Start: 2024-03-14 | End: 2024-03-14 | Stop reason: SURG

## 2024-03-14 RX ORDER — AMOXICILLIN 250 MG
1 CAPSULE ORAL
Status: DISCONTINUED | OUTPATIENT
Start: 2024-03-14 | End: 2024-03-18 | Stop reason: HOSPADM

## 2024-03-14 RX ORDER — MORPHINE SULFATE 4 MG/ML
4 INJECTION INTRAVENOUS
Status: DISCONTINUED | OUTPATIENT
Start: 2024-03-14 | End: 2024-03-18

## 2024-03-14 RX ORDER — MIDAZOLAM HYDROCHLORIDE 1 MG/ML
INJECTION INTRAMUSCULAR; INTRAVENOUS PRN
Status: DISCONTINUED | OUTPATIENT
Start: 2024-03-14 | End: 2024-03-14 | Stop reason: SURG

## 2024-03-14 RX ORDER — SODIUM CHLORIDE, SODIUM LACTATE, POTASSIUM CHLORIDE, CALCIUM CHLORIDE 600; 310; 30; 20 MG/100ML; MG/100ML; MG/100ML; MG/100ML
INJECTION, SOLUTION INTRAVENOUS
Status: DISCONTINUED | OUTPATIENT
Start: 2024-03-14 | End: 2024-03-14 | Stop reason: SURG

## 2024-03-14 RX ORDER — POLYETHYLENE GLYCOL 3350 17 G/17G
1 POWDER, FOR SOLUTION ORAL 2 TIMES DAILY PRN
Status: DISCONTINUED | OUTPATIENT
Start: 2024-03-14 | End: 2024-03-18 | Stop reason: HOSPADM

## 2024-03-14 RX ORDER — HYDROMORPHONE HYDROCHLORIDE 2 MG/ML
INJECTION, SOLUTION INTRAMUSCULAR; INTRAVENOUS; SUBCUTANEOUS PRN
Status: DISCONTINUED | OUTPATIENT
Start: 2024-03-14 | End: 2024-03-14

## 2024-03-14 RX ORDER — LABETALOL HYDROCHLORIDE 5 MG/ML
10 INJECTION, SOLUTION INTRAVENOUS EVERY 6 HOURS PRN
Status: DISCONTINUED | OUTPATIENT
Start: 2024-03-14 | End: 2024-03-14

## 2024-03-14 RX ORDER — HYDROMORPHONE HYDROCHLORIDE 1 MG/ML
0.4 INJECTION, SOLUTION INTRAMUSCULAR; INTRAVENOUS; SUBCUTANEOUS
Status: DISCONTINUED | OUTPATIENT
Start: 2024-03-14 | End: 2024-03-14 | Stop reason: HOSPADM

## 2024-03-14 RX ORDER — PAPAVERINE HYDROCHLORIDE 30 MG/ML
INJECTION INTRAMUSCULAR; INTRAVENOUS
Status: DISCONTINUED | OUTPATIENT
Start: 2024-03-14 | End: 2024-03-14 | Stop reason: HOSPADM

## 2024-03-14 RX ORDER — DOCUSATE SODIUM 100 MG/1
100 CAPSULE, LIQUID FILLED ORAL 2 TIMES DAILY
Status: DISCONTINUED | OUTPATIENT
Start: 2024-03-14 | End: 2024-03-18 | Stop reason: HOSPADM

## 2024-03-14 RX ORDER — HYDROMORPHONE HYDROCHLORIDE 2 MG/ML
INJECTION, SOLUTION INTRAMUSCULAR; INTRAVENOUS; SUBCUTANEOUS PRN
Status: DISCONTINUED | OUTPATIENT
Start: 2024-03-14 | End: 2024-03-14 | Stop reason: SURG

## 2024-03-14 RX ORDER — ACETAMINOPHEN 500 MG
1000 TABLET ORAL EVERY 6 HOURS PRN
Status: DISCONTINUED | OUTPATIENT
Start: 2024-03-20 | End: 2024-03-18

## 2024-03-14 RX ORDER — NALOXONE HYDROCHLORIDE 0.4 MG/ML
0.4 INJECTION, SOLUTION INTRAMUSCULAR; INTRAVENOUS; SUBCUTANEOUS ONCE
Status: COMPLETED | OUTPATIENT
Start: 2024-03-14 | End: 2024-03-14

## 2024-03-14 RX ORDER — LABETALOL HYDROCHLORIDE 5 MG/ML
10 INJECTION, SOLUTION INTRAVENOUS
Status: DISCONTINUED | OUTPATIENT
Start: 2024-03-14 | End: 2024-03-15

## 2024-03-14 RX ORDER — LABETALOL HYDROCHLORIDE 5 MG/ML
5 INJECTION, SOLUTION INTRAVENOUS
Status: DISCONTINUED | OUTPATIENT
Start: 2024-03-14 | End: 2024-03-14 | Stop reason: HOSPADM

## 2024-03-14 RX ORDER — CLONIDINE HYDROCHLORIDE 0.1 MG/1
0.1 TABLET ORAL EVERY 4 HOURS PRN
Status: DISCONTINUED | OUTPATIENT
Start: 2024-03-14 | End: 2024-03-18 | Stop reason: HOSPADM

## 2024-03-14 RX ORDER — SCOLOPAMINE TRANSDERMAL SYSTEM 1 MG/1
1 PATCH, EXTENDED RELEASE TRANSDERMAL
Status: DISCONTINUED | OUTPATIENT
Start: 2024-03-14 | End: 2024-03-16

## 2024-03-14 RX ORDER — OXYCODONE HYDROCHLORIDE 10 MG/1
10 TABLET ORAL
Status: DISCONTINUED | OUTPATIENT
Start: 2024-03-14 | End: 2024-03-18

## 2024-03-14 RX ORDER — CEFAZOLIN SODIUM 1 G/3ML
INJECTION, POWDER, FOR SOLUTION INTRAMUSCULAR; INTRAVENOUS PRN
Status: DISCONTINUED | OUTPATIENT
Start: 2024-03-14 | End: 2024-03-14 | Stop reason: SURG

## 2024-03-14 RX ORDER — BISACODYL 10 MG
10 SUPPOSITORY, RECTAL RECTAL
Status: DISCONTINUED | OUTPATIENT
Start: 2024-03-14 | End: 2024-03-18 | Stop reason: HOSPADM

## 2024-03-14 RX ORDER — NALOXONE HYDROCHLORIDE 0.4 MG/ML
INJECTION, SOLUTION INTRAMUSCULAR; INTRAVENOUS; SUBCUTANEOUS
Status: COMPLETED
Start: 2024-03-14 | End: 2024-03-14

## 2024-03-14 RX ORDER — DEXAMETHASONE SODIUM PHOSPHATE 4 MG/ML
4 INJECTION, SOLUTION INTRA-ARTICULAR; INTRALESIONAL; INTRAMUSCULAR; INTRAVENOUS; SOFT TISSUE EVERY 6 HOURS
Status: DISCONTINUED | OUTPATIENT
Start: 2024-03-14 | End: 2024-03-14

## 2024-03-14 RX ORDER — HYDRALAZINE HYDROCHLORIDE 20 MG/ML
10 INJECTION INTRAMUSCULAR; INTRAVENOUS
Status: DISCONTINUED | OUTPATIENT
Start: 2024-03-14 | End: 2024-03-15

## 2024-03-14 RX ORDER — ACETAMINOPHEN 500 MG
1000 TABLET ORAL EVERY 6 HOURS
Status: DISCONTINUED | OUTPATIENT
Start: 2024-03-15 | End: 2024-03-18

## 2024-03-14 RX ORDER — BUPIVACAINE HYDROCHLORIDE AND EPINEPHRINE 5; 5 MG/ML; UG/ML
INJECTION, SOLUTION PERINEURAL
Status: DISCONTINUED | OUTPATIENT
Start: 2024-03-14 | End: 2024-03-14 | Stop reason: HOSPADM

## 2024-03-14 RX ORDER — SODIUM CHLORIDE, SODIUM GLUCONATE, SODIUM ACETATE, POTASSIUM CHLORIDE AND MAGNESIUM CHLORIDE 526; 502; 368; 37; 30 MG/100ML; MG/100ML; MG/100ML; MG/100ML; MG/100ML
INJECTION, SOLUTION INTRAVENOUS
Status: DISCONTINUED | OUTPATIENT
Start: 2024-03-14 | End: 2024-03-14 | Stop reason: SURG

## 2024-03-14 RX ORDER — HYDROMORPHONE HYDROCHLORIDE 1 MG/ML
0.2 INJECTION, SOLUTION INTRAMUSCULAR; INTRAVENOUS; SUBCUTANEOUS
Status: DISCONTINUED | OUTPATIENT
Start: 2024-03-14 | End: 2024-03-14 | Stop reason: HOSPADM

## 2024-03-14 RX ORDER — DIPHENHYDRAMINE HYDROCHLORIDE 50 MG/ML
25 INJECTION INTRAMUSCULAR; INTRAVENOUS EVERY 6 HOURS PRN
Status: DISCONTINUED | OUTPATIENT
Start: 2024-03-14 | End: 2024-03-18 | Stop reason: HOSPADM

## 2024-03-14 RX ORDER — MEPERIDINE HYDROCHLORIDE 25 MG/ML
12.5 INJECTION INTRAMUSCULAR; INTRAVENOUS; SUBCUTANEOUS
Status: DISCONTINUED | OUTPATIENT
Start: 2024-03-14 | End: 2024-03-14 | Stop reason: HOSPADM

## 2024-03-14 RX ORDER — DEXAMETHASONE SODIUM PHOSPHATE 4 MG/ML
4 INJECTION, SOLUTION INTRA-ARTICULAR; INTRALESIONAL; INTRAMUSCULAR; INTRAVENOUS; SOFT TISSUE EVERY 6 HOURS
Status: DISCONTINUED | OUTPATIENT
Start: 2024-03-14 | End: 2024-03-18 | Stop reason: HOSPADM

## 2024-03-14 RX ORDER — BACITRACIN ZINC 500 [USP'U]/G
OINTMENT TOPICAL
Status: DISCONTINUED | OUTPATIENT
Start: 2024-03-14 | End: 2024-03-14 | Stop reason: HOSPADM

## 2024-03-14 RX ORDER — OXYCODONE HYDROCHLORIDE 5 MG/1
5 TABLET ORAL
Status: DISCONTINUED | OUTPATIENT
Start: 2024-03-14 | End: 2024-03-18 | Stop reason: HOSPADM

## 2024-03-14 RX ORDER — ENEMA 19; 7 G/133ML; G/133ML
1 ENEMA RECTAL
Status: DISCONTINUED | OUTPATIENT
Start: 2024-03-14 | End: 2024-03-16

## 2024-03-14 RX ORDER — DEXAMETHASONE SODIUM PHOSPHATE 4 MG/ML
4 INJECTION, SOLUTION INTRA-ARTICULAR; INTRALESIONAL; INTRAMUSCULAR; INTRAVENOUS; SOFT TISSUE
Status: DISCONTINUED | OUTPATIENT
Start: 2024-03-14 | End: 2024-03-18

## 2024-03-14 RX ORDER — DIPHENHYDRAMINE HCL 25 MG
25 TABLET ORAL EVERY 6 HOURS PRN
Status: DISCONTINUED | OUTPATIENT
Start: 2024-03-14 | End: 2024-03-18

## 2024-03-14 RX ORDER — SODIUM CHLORIDE 9 MG/ML
INJECTION, SOLUTION INTRAVENOUS CONTINUOUS
Status: DISCONTINUED | OUTPATIENT
Start: 2024-03-14 | End: 2024-03-15

## 2024-03-14 RX ORDER — DEXAMETHASONE 4 MG/1
4 TABLET ORAL EVERY 6 HOURS
Status: DISCONTINUED | OUTPATIENT
Start: 2024-03-14 | End: 2024-03-18 | Stop reason: HOSPADM

## 2024-03-14 RX ORDER — ONDANSETRON 2 MG/ML
4 INJECTION INTRAMUSCULAR; INTRAVENOUS EVERY 4 HOURS PRN
Status: DISCONTINUED | OUTPATIENT
Start: 2024-03-14 | End: 2024-03-18 | Stop reason: HOSPADM

## 2024-03-14 RX ORDER — AMOXICILLIN 250 MG
1 CAPSULE ORAL NIGHTLY
Status: DISCONTINUED | OUTPATIENT
Start: 2024-03-14 | End: 2024-03-18 | Stop reason: HOSPADM

## 2024-03-14 RX ADMIN — PHENYLEPHRINE HYDROCHLORIDE 100 MCG: 10 INJECTION INTRAVENOUS at 12:07

## 2024-03-14 RX ADMIN — CEFAZOLIN 2 G: 1 INJECTION, POWDER, FOR SOLUTION INTRAMUSCULAR; INTRAVENOUS at 19:07

## 2024-03-14 RX ADMIN — SODIUM CHLORIDE, SODIUM GLUCONATE, SODIUM ACETATE, POTASSIUM CHLORIDE AND MAGNESIUM CHLORIDE: 526; 502; 368; 37; 30 INJECTION, SOLUTION INTRAVENOUS at 11:08

## 2024-03-14 RX ADMIN — SODIUM CHLORIDE, POTASSIUM CHLORIDE, SODIUM LACTATE AND CALCIUM CHLORIDE: 600; 310; 30; 20 INJECTION, SOLUTION INTRAVENOUS at 10:56

## 2024-03-14 RX ADMIN — LORAZEPAM 0.5 MG: 0.5 TABLET ORAL at 07:07

## 2024-03-14 RX ADMIN — MORPHINE SULFATE 4 MG: 4 INJECTION, SOLUTION INTRAMUSCULAR; INTRAVENOUS at 23:00

## 2024-03-14 RX ADMIN — PROPOFOL 100 MCG/KG/MIN: 10 INJECTION, EMULSION INTRAVENOUS at 11:05

## 2024-03-14 RX ADMIN — DEXAMETHASONE SODIUM PHOSPHATE 4 MG: 4 INJECTION INTRA-ARTICULAR; INTRALESIONAL; INTRAMUSCULAR; INTRAVENOUS; SOFT TISSUE at 21:57

## 2024-03-14 RX ADMIN — FENTANYL CITRATE 50 MCG: 50 INJECTION, SOLUTION INTRAMUSCULAR; INTRAVENOUS at 13:37

## 2024-03-14 RX ADMIN — MIDAZOLAM HYDROCHLORIDE 2 MG: 1 INJECTION, SOLUTION INTRAMUSCULAR; INTRAVENOUS at 10:56

## 2024-03-14 RX ADMIN — HYDROCODONE BITARTRATE AND ACETAMINOPHEN 1 TABLET: 5; 325 TABLET ORAL at 09:04

## 2024-03-14 RX ADMIN — FENTANYL CITRATE 50 MCG: 50 INJECTION, SOLUTION INTRAMUSCULAR; INTRAVENOUS at 11:52

## 2024-03-14 RX ADMIN — PHENYLEPHRINE HYDROCHLORIDE 200 MCG: 10 INJECTION INTRAVENOUS at 11:12

## 2024-03-14 RX ADMIN — DEXAMETHASONE SODIUM PHOSPHATE 10 MG: 4 INJECTION INTRA-ARTICULAR; INTRALESIONAL; INTRAMUSCULAR; INTRAVENOUS; SOFT TISSUE at 11:20

## 2024-03-14 RX ADMIN — NALOXONE HYDROCHLORIDE 0.4 MG: 0.4 INJECTION, SOLUTION INTRAMUSCULAR; INTRAVENOUS; SUBCUTANEOUS at 20:45

## 2024-03-14 RX ADMIN — SODIUM CHLORIDE, POTASSIUM CHLORIDE, SODIUM LACTATE AND CALCIUM CHLORIDE: 600; 310; 30; 20 INJECTION, SOLUTION INTRAVENOUS at 14:46

## 2024-03-14 RX ADMIN — ONDANSETRON 4 MG: 2 INJECTION INTRAMUSCULAR; INTRAVENOUS at 19:02

## 2024-03-14 RX ADMIN — MANNITOL 45 G: 12.5 INJECTION, SOLUTION INTRAVENOUS at 15:10

## 2024-03-14 RX ADMIN — PHENYLEPHRINE HYDROCHLORIDE 0.5 MCG/KG/MIN: 10 INJECTION INTRAVENOUS at 12:07

## 2024-03-14 RX ADMIN — SODIUM CHLORIDE: 9 INJECTION, SOLUTION INTRAVENOUS at 22:18

## 2024-03-14 RX ADMIN — BACITRACIN ZINC: 500 OINTMENT TOPICAL at 21:57

## 2024-03-14 RX ADMIN — PROPOFOL 200 MG: 10 INJECTION, EMULSION INTRAVENOUS at 11:03

## 2024-03-14 RX ADMIN — FENTANYL CITRATE 100 MCG: 50 INJECTION, SOLUTION INTRAMUSCULAR; INTRAVENOUS at 11:03

## 2024-03-14 RX ADMIN — HYDROMORPHONE HYDROCHLORIDE 1 MG: 2 INJECTION INTRAMUSCULAR; INTRAVENOUS; SUBCUTANEOUS at 15:40

## 2024-03-14 RX ADMIN — LIDOCAINE HYDROCHLORIDE 100 MG: 20 INJECTION, SOLUTION EPIDURAL; INFILTRATION; INTRACAUDAL at 11:03

## 2024-03-14 RX ADMIN — HYDROMORPHONE HYDROCHLORIDE 0.5 MG: 2 INJECTION INTRAMUSCULAR; INTRAVENOUS; SUBCUTANEOUS at 11:47

## 2024-03-14 RX ADMIN — ACETAMINOPHEN 650 MG: 325 TABLET, FILM COATED ORAL at 01:51

## 2024-03-14 RX ADMIN — SODIUM CHLORIDE, POTASSIUM CHLORIDE, SODIUM LACTATE AND CALCIUM CHLORIDE: 600; 310; 30; 20 INJECTION, SOLUTION INTRAVENOUS at 18:14

## 2024-03-14 RX ADMIN — CEFAZOLIN 2 G: 1 INJECTION, POWDER, FOR SOLUTION INTRAMUSCULAR; INTRAVENOUS at 15:20

## 2024-03-14 RX ADMIN — FAMOTIDINE 20 MG: 10 INJECTION, SOLUTION INTRAVENOUS at 21:57

## 2024-03-14 RX ADMIN — CEFAZOLIN 2 G: 1 INJECTION, POWDER, FOR SOLUTION INTRAMUSCULAR; INTRAVENOUS at 11:20

## 2024-03-14 RX ADMIN — ROCURONIUM BROMIDE 50 MG: 10 INJECTION, SOLUTION INTRAVENOUS at 11:03

## 2024-03-14 ASSESSMENT — ENCOUNTER SYMPTOMS
NAUSEA: 0
VOMITING: 0
COUGH: 0
WEAKNESS: 1
TINGLING: 1
FEVER: 0
SHORTNESS OF BREATH: 0
ABDOMINAL PAIN: 0

## 2024-03-14 ASSESSMENT — PAIN DESCRIPTION - PAIN TYPE
TYPE: ACUTE PAIN

## 2024-03-14 ASSESSMENT — PAIN SCALES - GENERAL: PAIN_LEVEL: 0

## 2024-03-14 ASSESSMENT — FIBROSIS 4 INDEX: FIB4 SCORE: 1.1

## 2024-03-14 NOTE — CARE PLAN
The patient is Stable - Low risk of patient condition declining or worsening    Shift Goals  Clinical Goals: Pts vitals will remian stable throughout surgury  Patient Goals: Pain will be controlled to a tolerable level throughout shift  Family Goals: POC updates    Progress made toward(s) clinical / shift goals:  Pt's pain has remained at a tolerable level throughout shift thus far. Pt resting comfortably. Pt able to ambulate to restroom calls appropriatly. Pt updated on POC no further questions at this time.  VSS. Bed in lowest position/locked, Hourly rounding in progress.       Problem: Knowledge Deficit - Standard  Goal: Patient and family/care givers will demonstrate understanding of plan of care, disease process/condition, diagnostic tests and medications  Outcome: Progressing     Problem: Pain - Standard  Goal: Alleviation of pain or a reduction in pain to the patient’s comfort goal  Outcome: Progressing     Patient is not progressing towards the following goals:

## 2024-03-14 NOTE — ANESTHESIA PROCEDURE NOTES
Peripheral IV    Date/Time: 3/14/2024 11:15 AM    Performed by: Arturo Zavala M.D.  Authorized by: Arturo Zavala M.D.    Size:  18 G  Laterality:  Left  Peripheral IV Location:  Hand  Site Prep:  Alcohol  Technique:  Direct puncture  Attempts:  1

## 2024-03-14 NOTE — ANESTHESIA PROCEDURE NOTES
Airway    Date/Time: 3/14/2024 11:05 AM    Performed by: Arturo Zavala M.D.  Authorized by: Arturo Zavala M.D.    Location:  OR  Urgency:  Elective  Indications for Airway Management:  Anesthesia      Spontaneous Ventilation: absent    Sedation Level:  Deep  Preoxygenated: Yes    Patient Position:  Sniffing  Final Airway Type:  Endotracheal airway  Final Endotracheal Airway:  ETT  Cuffed: Yes    Technique Used for Successful ETT Placement:  Direct laryngoscopy    Insertion Site:  Oral  Blade Type:  Quintin  Laryngoscope Blade/Videolaryngoscope Blade Size:  3  ETT Size (mm):  7.5  Measured from:  Teeth  ETT to Teeth (cm):  24  Placement Verified by: auscultation and capnometry    Cormack-Lehane Classification:  Grade I - full view of glottis  Number of Attempts at Approach:  1

## 2024-03-14 NOTE — ANESTHESIA PREPROCEDURE EVALUATION
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            Relevant Problems   Other   (positive) Brain mass   (positive) Decreased sensation   (positive) Weakness       Physical Exam    Airway   Mallampati: II  TM distance: >3 FB  Neck ROM: full       Cardiovascular - normal exam  Rhythm: regular  Rate: normal  (-) murmur     Dental - normal exam             Pulmonary - normal exam  Breath sounds clear to auscultation     Abdominal    Neurological - normal exam                 Anesthesia Plan    ASA 2       Plan - general       Airway plan will be ETT    (Arterial line)      Induction: intravenous    Postoperative Plan: Postoperative administration of opioids is intended.    Pertinent diagnostic labs and testing reviewed    Informed Consent:    Anesthetic plan and risks discussed with patient.    Use of blood products discussed with: patient whom consented to blood products.

## 2024-03-14 NOTE — PROGRESS NOTES
Hospital Medicine Daily Progress Note    Date of Service  3/14/2024    Chief Complaint  Porfirio Campoverde is a 44 y.o. male admitted 3/12/2024 with right sided weakness.    Hospital Course  Porfirio Campoverde is a 44 y.o. male who presented 3/12/2024 as a transfer from outside facility for brain mass.  Patient initially presented to [facility] emergency department complaining of 1 week history of numbness and tingling and weakness in his right upper and lower extremity.  He states that over the last few days, has progressively been getting worse.  Patient also endorses headache that has been on and off.  Denies any dizziness or blurred vision.  He does state that he has decreased hearing in his right ear.     CT head was obtained that showed no acute hemorrhage.  There is a suspected extra-axial mass along the left cerebellopontine angle.  Most likely meningioma.  However aggressive processes such as metastatic attic foci not excluded.  Recommending MRI of the brain     CTA head and neck was also performed, still showing a cerebellopontine angle lesion as described in the CT head.  Could possibly represent an acoustic neuroma as well.    Interval Problem Update  3/13: MRI brain completed yesterday. Acoustic schwannoma visualized. Neurosurgery recommending surgical intervention. Planned for 3/14. NPO at [facility].     3/14: No acute overnight events. Afebrile, vitals stable and within normal limits. Planning for surgical intervention today for acoustic schwannoma. Currently NPO.     I have discussed this patient's plan of care and discharge plan at IDT rounds today with Case Management, Nursing, Nursing leadership, and other members of the IDT team.    Consultants/Specialty  neurosurgery    Code Status  Full Code    Disposition  Medically Cleared  I have placed the appropriate orders for post-discharge needs.    Review of Systems  Review of Systems   Constitutional:  Negative for fever.   HENT:  Positive for hearing  loss.    Respiratory:  Negative for cough and shortness of breath.    Cardiovascular:  Negative for chest pain.   Gastrointestinal:  Negative for abdominal pain, nausea and vomiting.   Neurological:  Positive for tingling and weakness.        Physical Exam  Temp:  [36.4 °C (97.6 °F)-36.7 °C (98.1 °F)] 36.6 °C (97.8 °F)  Pulse:  [65-70] 68  Resp:  [17-74] 18  BP: ()/(56-83) 129/83  SpO2:  [93 %-96 %] 93 %    Physical Exam  Vitals and nursing note reviewed.   Constitutional:       General: He is not in acute distress.     Appearance: Normal appearance. He is not ill-appearing.   Cardiovascular:      Rate and Rhythm: Normal rate and regular rhythm.      Heart sounds: No murmur heard.  Pulmonary:      Effort: Pulmonary effort is normal. No respiratory distress.      Breath sounds: Normal breath sounds. No wheezing.   Abdominal:      General: Abdomen is flat. Bowel sounds are normal. There is no distension.      Palpations: Abdomen is soft.      Tenderness: There is no abdominal tenderness.   Skin:     General: Skin is warm and dry.   Neurological:      Mental Status: He is alert and oriented to person, place, and time.      Cranial Nerves: No cranial nerve deficit.      Sensory: Sensory deficit present.      Motor: Weakness present.      Comments: Right side diminished hearing  Right UE and LE weakness 3/5  Right UE and LE loss of sensation    Psychiatric:         Mood and Affect: Mood normal.         Behavior: Behavior normal.         Fluids  No intake or output data in the 24 hours ending 03/14/24 1023      Laboratory  Recent Labs     03/12/24 0414 03/13/24  0715   WBC 8.4 7.6   RBC 5.25 5.50   HEMOGLOBIN 16.5 17.0   HEMATOCRIT 48.3 52.0   MCV 92.0 94.5   MCH 31.4 30.9   MCHC 34.2 32.7   RDW 45.6 46.8   PLATELETCT 291 286   MPV 9.8 9.5     Recent Labs     03/12/24 0414 03/13/24  0715   SODIUM 136 136   POTASSIUM 3.7 4.1   CHLORIDE 99 98   CO2 24 28   GLUCOSE 94 100*   BUN 10 14   CREATININE 0.99 1.08   CALCIUM  8.8 9.3     Recent Labs     03/13/24  1350   APTT 31.2   INR 0.87               Imaging  MR-BRAIN-WITH & W/O   Final Result   Addendum (preliminary) 1 of 1      1.  There is an approximately 31 x 22 x 26 cm sized enhancing lesion in    the left cerebellopontine angle with extension into the IAC consistent    with acoustic schwannoma. There is no labyrinthine extension. There is    mass effect upon the abe.   2.  No acute infarct or hemorrhage.      Final      1.  There is an approximately 31 x 22 x 26 cm sized enhancing lesion in the left cerebellopontine angle with extension into the IAC. There is mass effect upon the abe.   2.  No acute infarct or hemorrhage.         CT-CHEST,ABDOMEN,PELVIS WITH   Final Result      No mass or adenopathy detected.      OUTSIDE IMAGES-CT HEAD   Final Result             Assessment/Plan  * Brain mass- (present on admission)  Assessment & Plan  MRI brain: approximately 31 x 22 x 26 cm sized enhancing lesion in the left cerebellopontine cistern with extension into the IAC. There is mass effect upon the abe.   - Neurosurgery following  - Resume regular diet after surgery  - Pain control post operatively as indicated     Decreased sensation  Assessment & Plan  See plan above     Weakness  Assessment & Plan  See plan above         VTE prophylaxis:   SCDs/TEDs   pharmacologic prophylaxis contraindicated due to surgery      I have performed a physical exam and reviewed and updated ROS and Plan today (3/14/2024). In review of yesterday's note (3/13/2024), there are no changes except as documented above.

## 2024-03-14 NOTE — ANESTHESIA PROCEDURE NOTES
Arterial Line    Performed by: Arturo Zavala M.D.  Authorized by: Arturo Zavala M.D.    Start Time:  3/14/2024 11:10 AM  Localization: ultrasound guidance and surface landmarks    Patient Location:  OR  Indication: continuous blood pressure monitoring        Catheter Size:  20 G  Seldinger Technique?: Yes    Laterality:  Right  Site:  Radial artery  Line Secured:  Antimicrobial disc, tape and transparent dressing  Events: patient tolerated procedure well with no complications

## 2024-03-14 NOTE — PROGRESS NOTES
Neurosurgery Progress Note    Subjective:  No events overnight.    Exam:  Eye Openin Eyes open spontaneously Motor Response: 6 Follows commands Verbal Response: 5 Oriented to person, place, and time Total: 15  Awake, alert, oriented to person, place and time.  Pupils equally round and reactive to light, 4 to 3mm.  Extraocular movements full.  Face symmetric, HB 1.  Palate rises symmetrically.  Tongue is midline.  Shoulder shrug 5/5.  Finger-to-nose dysmetric on Right arm.  Patient moves all 4 extremities with full strength equally.  Sensation intact to light touch in all 4 extremities.  Decreased hearing to finger rub on the right, able to hear spoken words on the right  Decreased facial sensation on the right  +right pronator drift, mild  4+ right hand , biceps, triceps, deltoid  4+ right IP, quad, TA, EHL, gastrocnemius    Recent Labs     24  0414 24  0715   WBC 8.4 7.6   RBC 5.25 5.50   HEMOGLOBIN 16.5 17.0   HEMATOCRIT 48.3 52.0   MCV 92.0 94.5   MCH 31.4 30.9   MCHC 34.2 32.7   RDW 45.6 46.8   PLATELETCT 291 286   MPV 9.8 9.5     Recent Labs     24  0414 24  0715   SODIUM 136 136   POTASSIUM 3.7 4.1   CHLORIDE 99 98   CO2 24 28   GLUCOSE 94 100*   BUN 10 14   CREATININE 0.99 1.08   CALCIUM 8.8 9.3     Recent Labs     24  1350   APTT 31.2   INR 0.87           Intake/Output                         24 0700 - 24 0659 24 0700 - 03/15/24 0659     [id number] Total [id number] 1808-0807 Total                 Intake    P.O.  240  -- 240  --  -- --    P.O. 240 -- 240 -- -- --    Total Intake 240 -- 240 -- -- --       Output    Total Output -- -- -- -- -- --       Net I/O     240 -- 240 -- -- --          Assessment and Plan:    OR today for left retrosigmoid craniotomy for tumor resection  ICU thereafter    Please call with questions.    Gita Villarreal M.D.     My total time spent caring for the patient on the day of the encounter was 35 minutes.   This does  not include time spent on separately billable procedures/tests.

## 2024-03-14 NOTE — PROGRESS NOTES
Received EOS report assumed care of Pt. Pt resting in bed states 6/10 pain administered hydrocodone. Pt updated on POC, all questions answered. Interpretor used. Fall prevention measures in place. VSS

## 2024-03-15 PROBLEM — I60.9 SAH (SUBARACHNOID HEMORRHAGE) (HCC): Status: ACTIVE | Noted: 2024-03-15

## 2024-03-15 LAB
ANION GAP SERPL CALC-SCNC: 17 MMOL/L (ref 7–16)
BASOPHILS # BLD AUTO: 0.2 % (ref 0–1.8)
BASOPHILS # BLD: 0.04 K/UL (ref 0–0.12)
BUN SERPL-MCNC: 13 MG/DL (ref 8–22)
CALCIUM SERPL-MCNC: 8.2 MG/DL (ref 8.5–10.5)
CHLORIDE SERPL-SCNC: 98 MMOL/L (ref 96–112)
CO2 SERPL-SCNC: 21 MMOL/L (ref 20–33)
CREAT SERPL-MCNC: 1.06 MG/DL (ref 0.5–1.4)
EOSINOPHIL # BLD AUTO: 0 K/UL (ref 0–0.51)
EOSINOPHIL NFR BLD: 0 % (ref 0–6.9)
ERYTHROCYTE [DISTWIDTH] IN BLOOD BY AUTOMATED COUNT: 44.9 FL (ref 35.9–50)
GFR SERPLBLD CREATININE-BSD FMLA CKD-EPI: 90 ML/MIN/1.73 M 2
GLUCOSE SERPL-MCNC: 146 MG/DL (ref 65–99)
HCT VFR BLD AUTO: 45 % (ref 42–52)
HGB BLD-MCNC: 15 G/DL (ref 14–18)
IMM GRANULOCYTES # BLD AUTO: 0.19 K/UL (ref 0–0.11)
IMM GRANULOCYTES NFR BLD AUTO: 0.9 % (ref 0–0.9)
LYMPHOCYTES # BLD AUTO: 1.18 K/UL (ref 1–4.8)
LYMPHOCYTES NFR BLD: 5.5 % (ref 22–41)
MCH RBC QN AUTO: 30.9 PG (ref 27–33)
MCHC RBC AUTO-ENTMCNC: 33.3 G/DL (ref 32.3–36.5)
MCV RBC AUTO: 92.8 FL (ref 81.4–97.8)
MONOCYTES # BLD AUTO: 0.96 K/UL (ref 0–0.85)
MONOCYTES NFR BLD AUTO: 4.5 % (ref 0–13.4)
NEUTROPHILS # BLD AUTO: 19.2 K/UL (ref 1.82–7.42)
NEUTROPHILS NFR BLD: 88.9 % (ref 44–72)
NRBC # BLD AUTO: 0 K/UL
NRBC BLD-RTO: 0 /100 WBC (ref 0–0.2)
PATHOLOGY CONSULT NOTE: NORMAL
PLATELET # BLD AUTO: 274 K/UL (ref 164–446)
PMV BLD AUTO: 9.5 FL (ref 9–12.9)
POTASSIUM SERPL-SCNC: 4.2 MMOL/L (ref 3.6–5.5)
RBC # BLD AUTO: 4.85 M/UL (ref 4.7–6.1)
SODIUM SERPL-SCNC: 136 MMOL/L (ref 135–145)
WBC # BLD AUTO: 21.6 K/UL (ref 4.8–10.8)

## 2024-03-15 PROCEDURE — 97162 PT EVAL MOD COMPLEX 30 MIN: CPT

## 2024-03-15 PROCEDURE — 700102 HCHG RX REV CODE 250 W/ 637 OVERRIDE(OP): Performed by: NURSE PRACTITIONER

## 2024-03-15 PROCEDURE — 770001 HCHG ROOM/CARE - MED/SURG/GYN PRIV*

## 2024-03-15 PROCEDURE — 700105 HCHG RX REV CODE 258

## 2024-03-15 PROCEDURE — A9270 NON-COVERED ITEM OR SERVICE: HCPCS

## 2024-03-15 PROCEDURE — 85025 COMPLETE CBC W/AUTO DIFF WBC: CPT

## 2024-03-15 PROCEDURE — 97167 OT EVAL HIGH COMPLEX 60 MIN: CPT

## 2024-03-15 PROCEDURE — 97535 SELF CARE MNGMENT TRAINING: CPT

## 2024-03-15 PROCEDURE — 700111 HCHG RX REV CODE 636 W/ 250 OVERRIDE (IP): Mod: JZ

## 2024-03-15 PROCEDURE — A9270 NON-COVERED ITEM OR SERVICE: HCPCS | Performed by: NURSE PRACTITIONER

## 2024-03-15 PROCEDURE — 99233 SBSQ HOSP IP/OBS HIGH 50: CPT | Performed by: STUDENT IN AN ORGANIZED HEALTH CARE EDUCATION/TRAINING PROGRAM

## 2024-03-15 PROCEDURE — 700101 HCHG RX REV CODE 250

## 2024-03-15 PROCEDURE — 700102 HCHG RX REV CODE 250 W/ 637 OVERRIDE(OP)

## 2024-03-15 PROCEDURE — 700111 HCHG RX REV CODE 636 W/ 250 OVERRIDE (IP): Mod: JZ | Performed by: INTERNAL MEDICINE

## 2024-03-15 PROCEDURE — 99233 SBSQ HOSP IP/OBS HIGH 50: CPT | Performed by: HOSPITALIST

## 2024-03-15 PROCEDURE — 80048 BASIC METABOLIC PNL TOTAL CA: CPT

## 2024-03-15 RX ORDER — HYDRALAZINE HYDROCHLORIDE 20 MG/ML
10 INJECTION INTRAMUSCULAR; INTRAVENOUS
Status: DISCONTINUED | OUTPATIENT
Start: 2024-03-15 | End: 2024-03-18 | Stop reason: HOSPADM

## 2024-03-15 RX ORDER — ACETAMINOPHEN 650 MG/1
650 SUPPOSITORY RECTAL EVERY 6 HOURS
Status: DISCONTINUED | OUTPATIENT
Start: 2024-03-15 | End: 2024-03-18

## 2024-03-15 RX ORDER — LABETALOL HYDROCHLORIDE 5 MG/ML
10 INJECTION, SOLUTION INTRAVENOUS
Status: DISCONTINUED | OUTPATIENT
Start: 2024-03-15 | End: 2024-03-18 | Stop reason: HOSPADM

## 2024-03-15 RX ADMIN — ONDANSETRON 4 MG: 2 INJECTION INTRAMUSCULAR; INTRAVENOUS at 09:41

## 2024-03-15 RX ADMIN — FAMOTIDINE 20 MG: 10 INJECTION, SOLUTION INTRAVENOUS at 18:31

## 2024-03-15 RX ADMIN — MORPHINE SULFATE 4 MG: 4 INJECTION, SOLUTION INTRAMUSCULAR; INTRAVENOUS at 09:41

## 2024-03-15 RX ADMIN — OXYCODONE HYDROCHLORIDE 10 MG: 10 TABLET ORAL at 14:42

## 2024-03-15 RX ADMIN — DEXAMETHASONE SODIUM PHOSPHATE 4 MG: 4 INJECTION INTRA-ARTICULAR; INTRALESIONAL; INTRAMUSCULAR; INTRAVENOUS; SOFT TISSUE at 14:41

## 2024-03-15 RX ADMIN — ONDANSETRON 4 MG: 2 INJECTION INTRAMUSCULAR; INTRAVENOUS at 02:50

## 2024-03-15 RX ADMIN — DOCUSATE SODIUM 50 MG AND SENNOSIDES 8.6 MG 1 TABLET: 8.6; 5 TABLET, FILM COATED ORAL at 21:00

## 2024-03-15 RX ADMIN — WATER 2 G: 100 INJECTION, SOLUTION INTRAVENOUS at 14:43

## 2024-03-15 RX ADMIN — WATER 2 G: 100 INJECTION, SOLUTION INTRAVENOUS at 02:36

## 2024-03-15 RX ADMIN — MINERAL OIL AND WHITE PETROLATUM 1 APPLICATION: 30; 940 OINTMENT OPHTHALMIC at 21:05

## 2024-03-15 RX ADMIN — SODIUM CHLORIDE: 9 INJECTION, SOLUTION INTRAVENOUS at 08:00

## 2024-03-15 RX ADMIN — OXYCODONE HYDROCHLORIDE 10 MG: 10 TABLET ORAL at 18:31

## 2024-03-15 RX ADMIN — DOCUSATE SODIUM 100 MG: 100 CAPSULE, LIQUID FILLED ORAL at 18:31

## 2024-03-15 RX ADMIN — ACETAMINOPHEN 1000 MG: 500 TABLET, FILM COATED ORAL at 14:42

## 2024-03-15 RX ADMIN — DEXAMETHASONE SODIUM PHOSPHATE 4 MG: 4 INJECTION INTRA-ARTICULAR; INTRALESIONAL; INTRAMUSCULAR; INTRAVENOUS; SOFT TISSUE at 18:31

## 2024-03-15 RX ADMIN — ACETAMINOPHEN 1000 MG: 500 TABLET, FILM COATED ORAL at 18:31

## 2024-03-15 RX ADMIN — ONDANSETRON 4 MG: 2 INJECTION INTRAMUSCULAR; INTRAVENOUS at 18:32

## 2024-03-15 RX ADMIN — MORPHINE SULFATE 4 MG: 4 INJECTION, SOLUTION INTRAMUSCULAR; INTRAVENOUS at 05:38

## 2024-03-15 RX ADMIN — BACITRACIN ZINC: 500 OINTMENT TOPICAL at 05:06

## 2024-03-15 RX ADMIN — FAMOTIDINE 20 MG: 10 INJECTION, SOLUTION INTRAVENOUS at 05:07

## 2024-03-15 RX ADMIN — WATER 2 G: 100 INJECTION, SOLUTION INTRAVENOUS at 21:04

## 2024-03-15 RX ADMIN — ACETAMINOPHEN 650 MG: 650 SUPPOSITORY RECTAL at 00:37

## 2024-03-15 RX ADMIN — DEXAMETHASONE SODIUM PHOSPHATE 4 MG: 4 INJECTION INTRA-ARTICULAR; INTRALESIONAL; INTRAMUSCULAR; INTRAVENOUS; SOFT TISSUE at 05:07

## 2024-03-15 RX ADMIN — ACETAMINOPHEN 650 MG: 650 SUPPOSITORY RECTAL at 05:07

## 2024-03-15 RX ADMIN — MORPHINE SULFATE 4 MG: 4 INJECTION, SOLUTION INTRAMUSCULAR; INTRAVENOUS at 02:34

## 2024-03-15 RX ADMIN — BACITRACIN ZINC: 500 OINTMENT TOPICAL at 18:31

## 2024-03-15 ASSESSMENT — GAIT ASSESSMENTS
DISTANCE (FEET): 80
GAIT LEVEL OF ASSIST: MINIMAL ASSIST
ASSISTIVE DEVICE: HAND HELD ASSIST
DEVIATION: ATAXIC

## 2024-03-15 ASSESSMENT — PAIN DESCRIPTION - PAIN TYPE
TYPE: ACUTE PAIN
TYPE: ACUTE PAIN;SURGICAL PAIN
TYPE: ACUTE PAIN

## 2024-03-15 ASSESSMENT — COGNITIVE AND FUNCTIONAL STATUS - GENERAL
SUGGESTED CMS G CODE MODIFIER MOBILITY: CK
MOVING TO AND FROM BED TO CHAIR: A LITTLE
SUGGESTED CMS G CODE MODIFIER DAILY ACTIVITY: CJ
TURNING FROM BACK TO SIDE WHILE IN FLAT BAD: A LITTLE
HELP NEEDED FOR BATHING: A LITTLE
TOILETING: A LITTLE
DAILY ACTIVITIY SCORE: 21
DRESSING REGULAR LOWER BODY CLOTHING: A LITTLE
MOBILITY SCORE: 16
STANDING UP FROM CHAIR USING ARMS: A LITTLE
MOVING FROM LYING ON BACK TO SITTING ON SIDE OF FLAT BED: A LITTLE
WALKING IN HOSPITAL ROOM: A LOT
CLIMB 3 TO 5 STEPS WITH RAILING: A LOT

## 2024-03-15 ASSESSMENT — ENCOUNTER SYMPTOMS
MUSCULOSKELETAL NEGATIVE: 1
SPUTUM PRODUCTION: 0
HEADACHES: 1
WEAKNESS: 1
VOMITING: 0
PALPITATIONS: 0
COUGH: 0
NERVOUS/ANXIOUS: 1
FOCAL WEAKNESS: 0
SORE THROAT: 0
FEVER: 0
NAUSEA: 0
TINGLING: 1
ABDOMINAL PAIN: 0
EYES NEGATIVE: 1
SHORTNESS OF BREATH: 0
CHILLS: 0

## 2024-03-15 ASSESSMENT — FIBROSIS 4 INDEX: FIB4 SCORE: 1.15

## 2024-03-15 ASSESSMENT — ACTIVITIES OF DAILY LIVING (ADL): TOILETING: INDEPENDENT

## 2024-03-15 NOTE — ANESTHESIA POSTPROCEDURE EVALUATION
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 Responsible Provider: Grady Mitchell M.D.    Anesthesia Type: general ASA Status: 2            Final Anesthesia Type: general  Last vitals  BP   Blood Pressure: 115/82    Temp   36.3 °C (97.4 °F)    Pulse   68   Resp   12    SpO2   92 %      Anesthesia Post Evaluation    Patient location during evaluation: PACU  Patient participation: complete - patient participated  Level of consciousness: sleepy but conscious  Pain score: 0    Airway patency: patent  Anesthetic complications: no  Cardiovascular status: hemodynamically stable  Respiratory status: acceptable  Hydration status: euvolemic    PONV: none          No notable events documented.

## 2024-03-15 NOTE — CONSULTS
"Critical Care Consultation    Date of Service  3/14/2024    Referring Physician  Dr. Gita Villarreal    Consulting Physician  Adalid Christine D.O.    Reason for Consultation  Post-op left retrosigmoid craniotomy and tumor debulking     History of Presenting Illness  From Dr. Villarreal's note: \"The patient is a 44 y.o. male presenting with acute onset of right facial, body, arm and leg numbness, weakness for 1 week. He has also been reporting headaches for over a month. He presented to [facility] where CT head revealed a CP angle mass, and transferred to [facility]. Neurosurgery consulted.     He reports decreased hearing on the right, longstanding. No left facial weakness, numbness, hearing difficulty.\"    Review of Systems   Review of Systems   Unable to perform ROS: Acuity of condition       Past Medical History   has a past medical history of Brain mass (03/12/2024) and Tobacco abuse (03/14/2024).    Surgical History   has no past surgical history on file.    Family History  Family history is unknown by patient.    Social History   reports that he quit smoking 9 days ago. His smoking use included cigarettes. He has never used smokeless tobacco. He reports current alcohol use of about 12.0 oz of alcohol per week. He reports that he does not use drugs.    Medications  Prior to Admission Medications   Prescriptions Last Dose Informant Patient Reported? Taking?   acetaminophen (TYLENOL) 325 MG Tab   Yes Yes   Sig: Take 650 mg by mouth every four hours as needed for Mild Pain or Moderate Pain.      Facility-Administered Medications: None       Allergies  No Known Allergies    Physical Exam  Temp:  [36.4 °C (97.6 °F)-36.7 °C (98.1 °F)] 36.6 °C (97.8 °F)  Pulse:  [65-70] 68  Resp:  [18] 18  BP: ()/(56-83) 129/83  SpO2:  [93 %-96 %] 93 %    Physical Exam  Vitals reviewed. Exam conducted with a chaperone present.   Constitutional:       General: He is sleeping. He is not in acute distress.     Appearance: He is not " ill-appearing or toxic-appearing.   HENT:      Head:        Comments: Surgical staples; site c/d/i  Cardiovascular:      Rate and Rhythm: Normal rate.      Pulses: Normal pulses.   Pulmonary:      Effort: Pulmonary effort is normal.      Breath sounds: Normal breath sounds.   Abdominal:      Palpations: Abdomen is soft.   Musculoskeletal:      Right lower leg: No edema.      Left lower leg: No edema.   Skin:     General: Skin is warm.      Capillary Refill: Capillary refill takes less than 2 seconds.      Coloration: Skin is not jaundiced.         Fluids  Date 03/14/24 0700 - 03/15/24 0659   Shift 6836-7113 0418-8394 2817-7830 24 Hour Total   INTAKE   I.V. 1000 2500  3500   Shift Total 1000 2500  3500   OUTPUT   Urine  900  900   Blood  300  300   Shift Total  1200  1200   Weight (kg) 92.5 92.5 92.5 92.5       Laboratory  Recent Labs     03/12/24  0414 03/13/24  0715   WBC 8.4 7.6   RBC 5.25 5.50   HEMOGLOBIN 16.5 17.0   HEMATOCRIT 48.3 52.0   MCV 92.0 94.5   MCH 31.4 30.9   MCHC 34.2 32.7   RDW 45.6 46.8   PLATELETCT 291 286   MPV 9.8 9.5     Recent Labs     03/12/24  0414 03/13/24  0715   SODIUM 136 136   POTASSIUM 3.7 4.1   CHLORIDE 99 98   CO2 24 28   GLUCOSE 94 100*   BUN 10 14   CREATININE 0.99 1.08   CALCIUM 8.8 9.3     Recent Labs     03/13/24  1350   APTT 31.2   INR 0.87                 Imaging  Outside imaging:  CT head was obtained that showed no acute hemorrhage.  There is a suspected extra-axial mass along the left cerebellopontine angle.  Most likely meningioma.  However aggressive processes such as metastatic attic foci not excluded.  Recommending MRI of the brain     CTA head and neck was also performed, still showing a cerebellopontine angle lesion as described in the CT head.  Could possibly represent an acoustic neuroma as well.    Assessment/Plan  * Status post craniotomy  Assessment & Plan  Surgery date: 3/14  Surgeon: Dr. Villarreal  Left retorsigmoid craniotomy with tumor removal  Post-op  plan:  -ICU admission for q1h neurochecks  -SBP <140  -Decadron 4 mg q6h   -started H2B for GI PPX while on decadron     Brain mass- (present on admission)  Assessment & Plan  Underwent surgery on 3/14  Pathology pending     Tobacco abuse- (present on admission)  Assessment & Plan  Will need tobacco cessation counseling         The patient remains critically ill.  Critical care time = 75 minutes in directly providing and coordinating critical care and extensive data review.  No time overlap and excludes procedures.    __________  Adalid Christine, DO  Pulmonary and Critical Care Medicine  [facility]    Please note that this dictation was created using voice recognition software. The accuracy of the dictation is limited to the abilities of the software. I have made every reasonable attempt to correct obvious errors, but I expect that there are errors of grammar and possibly content that I did not discover before finalizing the note.

## 2024-03-15 NOTE — CARE PLAN
The patient is Watcher - Medium risk of patient condition declining or worsening    Shift Goals  Clinical Goals: Q1h neuro checks, SBP<140; Pain control  Patient Goals: Pain control  Family Goals: ISH    Progress made toward(s) clinical / shift goals:    Problem: Knowledge Deficit - Standard  Goal: Patient and family/care givers will demonstrate understanding of plan of care, disease process/condition, diagnostic tests and medications  Outcome: Progressing     Problem: Pain - Standard  Goal: Alleviation of pain or a reduction in pain to the patient’s comfort goal  Outcome: Progressing     Problem: Skin Integrity  Goal: Skin integrity is maintained or improved  Outcome: Progressing       Patient is not progressing towards the following goals:

## 2024-03-15 NOTE — THERAPY
Physical Therapy   Initial Evaluation     Patient Name: Porfirio Campoverde  Age:  44 y.o., Sex:  male  Medical Record #: [id number]  Today's Date: 3/15/2024          Assessment    44 y.o. male was brought in to the hospital for a brain mass with reports of progressively worsening numbness, tingling, and weakness x1 week in the R U/LE.  Pt underwent a L retrosigmoid craniotomy with resection of the mass on 3/14.  PMHx is unremarkable.  He lives with his family in a 1SH with 2 steps to enter.  He was independent for mobility and ADLs without an AD, driving, and working cutting down trees.  He now presents with decreased balance and pain impairing his bed mobility, transfers, and gait and increasing his risk of falling.  He will benefit from continued acute PT services with recommendation for post acute inpatient rehab in order to address the above deficits to return home with family safely.    Plan    Physical Therapy Initial Treatment Plan   Treatment Plan : (P) Bed Mobility, Equipment, Gait Training, Neuro Re-Education / Balance, Self Care / Home Evaluation, Stair Training, Therapeutic Activities, Therapeutic Exercise  Treatment Frequency: (P) 5 Times per Week    DC Equipment Recommendations: (P) Unable to determine at this time  Discharge Recommendations: (P) Recommend post-acute placement for additional physical therapy services prior to discharge home            Objective       03/15/24 0938    Services   Is patient using  services for this encounter? Yes   Language Interpreted Austrian    Name Zari  (#[id number])    Mode iPad   Content of Interpretation (select all) History/Visit information;Patient Education;Consent   Initial Contact Note    Initial Contact Note Order Received and Verified, Physical Therapy Evaluation in Progress with Full Report to Follow.   Vitals   Pulse 75  (supine, 81 sitting, 71 /p ambulation)   Blood Pressure 124/88  (supine, 136/86 sitting, 139/83  /p ambulation)   O2 (LPM) 2   O2 Delivery Device Nasal Cannula   Pain 0 - 10 Group   Therapist Pain Assessment Post Activity  (headache with nausea)   Prior Living Situation   Housing / Facility 1 Story House   Steps Into Home 2   Rail None   Bathroom Set up Bathtub / Shower Combination   Equipment Owned None   Lives with - Patient's Self Care Capacity Spouse;Parents  (children 19-6 years old)   Prior Level of Functional Mobility   Bed Mobility Independent   Transfer Status Independent   Ambulation Independent   Ambulation Distance community   Assistive Devices Used None   Stairs Independent   Comments drives, works cutting trees   History of Falls   History of Falls No   Cognition    Cognition / Consciousness WDL   Level of Consciousness Alert   Passive ROM Lower Body   Passive ROM Lower Body WDL   Strength Lower Body   Lower Body Strength  WDL   Sensation Lower Body   Lower Extremity Sensation   X   Comments   (Pt reports RLE feels number whyte LLE but light touch is intact)   Coordination Lower Body    Coordination Lower Body  X   Comments ataxic   Balance Assessment   Sitting Balance (Static) Fair -   Sitting Balance (Dynamic) Fair -   Standing Balance (Static) Trace +   Standing Balance (Dynamic) Trace +   Weight Shift Sitting Fair   Weight Shift Standing Poor   Bed Mobility    Supine to Sit Standby Assist   Sit to Supine Contact Guard Assist   Scooting Standby Assist   Rolling Standby Assist   Comments HOB elevated, rail   Gait Analysis   Gait Level Of Assist Minimal Assist  (x2)   Assistive Device Hand Held Assist   Distance (Feet) 80   # of Times Distance was Traveled 1   Deviation Ataxic  (lateral LOB, scissoring)   Weight Bearing Status FWB   Functional Mobility   Sit to Stand Contact Guard Assist  (posterior LOB with sit down on the bed)   Bed, Chair, Wheelchair Transfer Minimal Assist  (x2 with HHA)   Transfer Method Stand Step   ICU Target Mobility Level   ICU Mobility - Targeted Level Level 4   Activity  Tolerance   Sitting in Chair 1 min   Standing 3 min   Edema / Skin Assessment   Edema / Skin  Not Assessed   Patient / Family Goals    Patient / Family Goal #1 to go home   Short Term Goals    Short Term Goal # 1 Pt will perform supine < > sit independent with bed flat, no rail in 6 visits in order to peform upright tasks.   Short Term Goal # 2 Pt will perform step pivot transfers mod I with LRAD in 6 visits in order to return home safely.   Short Term Goal # 3 Pt will ambulate 150' with LRAD in 6 visits in order to return home safely.   Short Term Goal # 4 Pt will ascend/ descend 2 steps with LRAD in 6 visits in order to return home safely.   Education Group   Education Provided Role of Physical Therapist   Role of Physical Therapist Patient Response Patient;Acceptance;Explanation;Verbal Demonstration;Action Demonstration   Physical Therapy Initial Treatment Plan    Treatment Plan  Bed Mobility;Equipment;Gait Training;Neuro Re-Education / Balance;Self Care / Home Evaluation;Stair Training;Therapeutic Activities;Therapeutic Exercise   Treatment Frequency 5 Times per Week   Problem List    Problems Pain;Impaired Bed Mobility;Impaired Transfers;Impaired Ambulation;Impaired Balance;Decreased Activity Tolerance   Anticipated Discharge Equipment and Recommendations   DC Equipment Recommendations Unable to determine at this time   Discharge Recommendations Recommend post-acute placement for additional physical therapy services prior to discharge home   Interdisciplinary Plan of Care Collaboration   IDT Collaboration with  Nursing   Patient Position at End of Therapy In Bed;Call Light within Reach  (Nurse in the room)   Collaboration Comments RN updated   Session Information   Date / Session Number  3/15 - 1 (1/5, 3/21)

## 2024-03-15 NOTE — CARE PLAN
The patient is Watcher - Medium risk of patient condition declining or worsening    Shift Goals  Clinical Goals: Q. 1 and stable neuro exams, stable hemodynamics  Patient Goals: pain control, water  Family Goals: visit again in AM    Progress made toward(s) clinical / shift goals:    Problem: Knowledge Deficit - Standard  Goal: Patient and family/care givers will demonstrate understanding of plan of care, disease process/condition, diagnostic tests and medications  Outcome: Progressing     Problem: Pain - Standard  Goal: Alleviation of pain or a reduction in pain to the patient’s comfort goal  Outcome: Progressing     Problem: Skin Integrity  Goal: Skin integrity is maintained or improved  Outcome: Progressing     Problem: Hemodynamics  Goal: Patient's hemodynamics, fluid balance and neurologic status will be stable or improve  Outcome: Progressing     Problem: Respiratory  Goal: Patient will achieve/maintain optimum respiratory ventilation and gas exchange  Outcome: Progressing     Problem: Mobility  Goal: Patient's capacity to carry out activities will improve  Outcome: Progressing     Problem: Craniotomy Surgery  Goal: Post-Operative Craniotomy Surgery: Patient will achieve optimal post-surgical outcomes  Outcome: Progressing     Problem: Neuro Status  Goal: Neuro status will remain stable or improve  Outcome: Progressing  Note: Stable Q. 1 neuro checks up to this point.     Problem: Knowledge Deficit - Neuro Surgical  Goal: Patient's understanding of spinal precautions, appropriate body mechanics and incision care will improve  Outcome: Progressing     Problem: Pain - Post Surgery  Goal: Alleviation or reduction of pain post surgery  Outcome: Progressing     Problem: Incision Care  Goal: Optimal post surgical incision care  Outcome: Progressing       Patient is not progressing towards the following goals: NA

## 2024-03-15 NOTE — OP REPORT
Brief Operative Note    Date: 3/14/2024    Surgeon: Gita Villarreal M.D.    Assistant: CHEMA Brown    Pre-operative Diagnosis: CPA mass    Post-operative Diagnosis: same as pre-operative    Procedure: left retrosigmoid craniotomy for resection of mass    Findings: large left CPA mass    Specimens: frozen and permanent    EBL: 300cc    Drains: none    Complications: none apparent    Disposition: extubated, stable to PACU    Gita Villarreal M.D.

## 2024-03-15 NOTE — PROGRESS NOTES
4 Eyes Skin Assessment Completed by MAURICIO Sykes and MAURICIO Coyne.    Head Incision on posterior left side behind ear  Ears WDL  Nose WDL  Mouth WDL  Neck WDL  Breast/Chest WDL  Shoulder Blades WDL  Spine WDL  (R) Arm/Elbow/Hand Redness and Blanching  (L) Arm/Elbow/Hand Redness and Blanching  Abdomen WDL  Groin WDL  Scrotum/Coccyx/Buttocks WDL  (R) Leg WDL  (L) Leg WDL  (R) Heel/Foot/Toe Redness and Blanching  (L) Heel/Foot/Toe Redness and Blanching          Devices In Places Blood Pressure Cuff, Pulse Ox, and Compression Dressing      Interventions In Place NC W/Ear Foams, Pillows, and Pressure Redistribution Mattress    Possible Skin Injury No    Pictures Uploaded Into Epic N/A  Wound Consult Placed N/A  RN Wound Prevention Protocol Ordered No

## 2024-03-15 NOTE — PROGRESS NOTES
[facility] Medicine Daily Progress Note    Date of Service  3/15/2024    Chief Complaint  Porfirio Campoverde is a 44 y.o. male admitted 3/12/2024 with right sided weakness.    Hospital Course  Porfirio Campoverde is a 44 y.o. male who presented 3/12/2024 as a transfer from outside facility for brain mass.  Patient initially presented to [facility] emergency department complaining of 1 week history of numbness and tingling and weakness in his right upper and lower extremity.  He states that over the last few days, has progressively been getting worse.  Patient also endorses headache that has been on and off.  Denies any dizziness or blurred vision.  He does state that he has decreased hearing in his right ear.  CT head was obtained that showed no acute hemorrhage.  There is a suspected extra-axial mass along the left cerebellopontine angle.  Most likely meningioma.  However aggressive processes such as metastatic attic foci not excluded.  Recommending MRI of the brain  CTA head and neck was also performed, still showing a cerebellopontine angle lesion as described in the CT head.  Could possibly represent an acoustic neuroma as well.    Interval Problem Update  Patient seen and examined today.  Data, Medication data reviewed.  Case discussed with nursing as available.  Plan of Care reviewed with patient and notified of changes.  3/13: MRI brain completed yesterday. Acoustic schwannoma visualized. Neurosurgery recommending surgical intervention. Planned for 3/14. NPO at [facility].   3/14: No acute overnight events. Afebrile, vitals stable and within normal limits. Planning for surgical intervention today for acoustic schwannoma. Currently NPO.   3/15 the patient complains of surgical site discomfort and pain, he has mild left face asymmetry, he is otherwise neurologically intact except for decreased hearing on the left,  Using the  line the patient was educated about current status and  expectations,  Neurosurgical follow-up as noted  The patient is approved to leave the ICU, every 4 neurochecks,  Ongoing dexamethasone treatment, PT OT discharge planning  I have discussed this patient's plan of care and discharge plan at IDT rounds today with Case Management, Nursing, Nursing leadership, and other members of the IDT team.    Consultants/Specialty  neurosurgery  Critical care    Code Status  Full Code    Disposition  The patient is not medically cleared for discharge to home or a post-acute facility.  Anticipate discharge to: home with close outpatient follow-up    I have placed the appropriate orders for post-discharge needs.    Review of Systems  Review of Systems   Constitutional:  Negative for fever.   HENT:  Positive for hearing loss.    Respiratory:  Negative for cough and shortness of breath.    Cardiovascular:  Negative for chest pain.   Gastrointestinal:  Negative for abdominal pain, nausea and vomiting.   Neurological:  Positive for tingling, weakness and headaches.   Psychiatric/Behavioral:  The patient is nervous/anxious.         Physical Exam  Temp:  [36.1 °C (97 °F)-36.9 °C (98.4 °F)] 36.9 °C (98.4 °F)  Pulse:  [51-80] 76  Resp:  [8-27] 17  BP: (102-139)/(52-88) 119/55  SpO2:  [91 %-97 %] 95 %    Physical Exam  Vitals and nursing note reviewed.   Constitutional:       General: He is not in acute distress.     Appearance: Normal appearance. He is not ill-appearing.   HENT:      Head:      Comments: Postsurgical status, incisions CDI  Cardiovascular:      Rate and Rhythm: Normal rate and regular rhythm.      Heart sounds: No murmur heard.  Pulmonary:      Effort: Pulmonary effort is normal. No respiratory distress.      Breath sounds: Normal breath sounds. No wheezing.   Abdominal:      General: Abdomen is flat. Bowel sounds are normal. There is no distension.      Palpations: Abdomen is soft.      Tenderness: There is no abdominal tenderness.   Skin:     General: Skin is warm and dry.    Neurological:      Mental Status: He is alert and oriented to person, place, and time.      Cranial Nerves: No cranial nerve deficit.      Sensory: Sensory deficit present.      Motor: Weakness present.      Comments: Slight left facial symmetry  Left-sided hearing decreased     Psychiatric:         Mood and Affect: Mood normal.         Behavior: Behavior normal.         Fluids    Intake/Output Summary (Last 24 hours) at 3/15/2024 1641  Last data filed at 3/15/2024 1000  Gross per 24 hour   Intake 3666.71 ml   Output 2535 ml   Net 1131.71 ml         Laboratory  Recent Labs     03/13/24  0715 03/15/24  0304   WBC 7.6 21.6*   RBC 5.50 4.85   HEMOGLOBIN 17.0 15.0   HEMATOCRIT 52.0 45.0   MCV 94.5 92.8   MCH 30.9 30.9   MCHC 32.7 33.3   RDW 46.8 44.9   PLATELETCT 286 274   MPV 9.5 9.5     Recent Labs     03/13/24  0715 03/15/24  0304   SODIUM 136 136   POTASSIUM 4.1 4.2   CHLORIDE 98 98   CO2 28 21   GLUCOSE 100* 146*   BUN 14 13   CREATININE 1.08 1.06   CALCIUM 9.3 8.2*     Recent Labs     03/13/24  1350   APTT 31.2   INR 0.87               Imaging  CT-HEAD W/O   Final Result         1.  Subarachnoid hemorrhage in the posterior fossa within the basal cisterns and along the surgical bed   2.  Layering subarachnoid hemorrhage along the bilateral tentorium.   3.  Pneumocephalus, within expected limits for postsurgical changes.         MR-BRAIN-WITH & W/O   Final Result   Addendum (preliminary) 1 of 1      1.  There is an approximately 31 x 22 x 26 cm sized enhancing lesion in    the left cerebellopontine angle with extension into the IAC consistent    with acoustic schwannoma. There is no labyrinthine extension. There is    mass effect upon the abe.   2.  No acute infarct or hemorrhage.      Final      1.  There is an approximately 31 x 22 x 26 cm sized enhancing lesion in the left cerebellopontine angle with extension into the IAC. There is mass effect upon the abe.   2.  No acute infarct or hemorrhage.          CT-CHEST,ABDOMEN,PELVIS WITH   Final Result      No mass or adenopathy detected.      OUTSIDE IMAGES-CT HEAD   Final Result             Assessment/Plan  * Status post craniotomy  Assessment & Plan  Residual pain,  Symptomatic care,  Neurosurgical follow-up    Brain mass- (present on admission)  Assessment & Plan  MRI brain: approximately 31 x 22 x 26 cm sized enhancing lesion in the left cerebellopontine cistern with extension into the IAC. There is mass effect upon the abe.   - Neurosurgery following  - Resume regular diet after surgery  - Pain control post operatively as indicated     Tobacco abuse- (present on admission)  Assessment & Plan  History of, cessation advised    Decreased sensation  Assessment & Plan  See plan above     Weakness  Assessment & Plan  See plan above       Plan  Postoperative care  Pain control  Decadron as per neurosurgical recommendations  GI prophylaxis  PT OT, discharge planning  See orders  Patient is has a high medical complexity, complex decision making and is at high risk for complication, morbidity, and mortality.    VTE prophylaxis:   SCDs/TEDs      I have performed a physical exam and reviewed and updated ROS and Plan today (3/15/2024). In review of yesterday's note (3/14/2024), there are no changes except as documented above.      Please note that this dictation was created using voice recognition software. I have made every reasonable attempt to correct obvious errors, but I expect that there are errors of grammar and possibly context that I did not discover before finalizing the note.

## 2024-03-15 NOTE — THERAPY
Occupational Therapy   Initial Evaluation     Patient Name: Porfirio Campoverde  Age:  44 y.o., Sex:  male  Medical Record #: [id number]  Today's Date: 3/15/2024          Assessment  Patient is 44 y.o. male with a diagnosis of R side brain mass s/p crani.  Pt currently limited by decreased functional mobility, activity tolerance, strength, coordination, balance, and pain which are affecting pt's ability to complete ADLs/IADLs at baseline. Pt would benefit from OT services in the acute care setting to maximize functional recovery.       Plan    Occupational Therapy Initial Treatment Plan   Treatment Interventions: (P) Self Care / Activities of Daily Living, Therapeutic Activity  Treatment Frequency: (P) 4 Times per Week  Duration: (P) Until Therapy Goals Met       Discharge Recommendations: (P) Recommend post-acute placement for additional occupational therapy services prior to discharge home        03/15/24 0932   Prior Living Situation   Housing / Facility 1 Story House   Steps Into Home 2   Rail None   Bathroom Set up Bathtub / Shower Combination   Equipment Owned None   Lives with - Patient's Self Care Capacity Spouse;Parents;Child Less than 18 Years of Age;Adult Children   Prior Level of ADL Function   Self Feeding Independent   Grooming / Hygiene Independent   Bathing Independent   Dressing Independent   Toileting Independent   Strength Upper Body   Upper Body Strength  X   Comments 3-/5 R UE   Coordination Upper Body   Comments slight decrease with fine motor R hand   ADL Assessment   Grooming Supervision   Upper Body Dressing Supervision   Lower Body Dressing Minimal Assist   Toileting Minimal Assist   Functional Mobility   Sit to Stand Minimal Assist   Bed, Chair, Wheelchair Transfer Minimal Assist   Short Term Goals   Short Term Goal # 1 supervised with LB dressing   Short Term Goal # 2 supervised with ADL txfs   Occupational Therapy Initial Treatment Plan    Treatment Interventions Self Care / Activities of  Daily Living;Therapeutic Activity   Treatment Frequency 4 Times per Week   Duration Until Therapy Goals Met   Anticipated Discharge Equipment and Recommendations   Discharge Recommendations Recommend post-acute placement for additional occupational therapy services prior to discharge home

## 2024-03-15 NOTE — OR NURSING
1953: Pt arrived via bed with anesthesia and RN. Report received. See flowsheet for VS. Orders reviewed and initiated.   2005: Dr. Villarreal at bedside. Orders to obtain STAT CT.  2040: Pt transported to CT on transport monitor and 15L nonrebreather with ACLS RN x3. No adverse events.   2040: Dr. Villarreal at bedside. Orders for Narcan.   2124: Dr. Villarreal at bedside.   2125: Report called to MAURICIO Michel. All questions answered. VSS. Surgical site CDI. No patient distress. Alert and oriented x1, following commands.  Pt transported to room in stable condition on 2L NC and transport monitor with ACLS RN and all personal belongings. No adverse events. Attempt to contact family unsuccessful.

## 2024-03-15 NOTE — HOSPITAL COURSE
3/12 - Admitted to the hospital floor    3/14 - Admitted to ICU post vestibular schwannoma resection    3/15 - Doing very well, ok for floor

## 2024-03-15 NOTE — PROGRESS NOTES
4 Eyes Skin Assessment Completed by Mariel RN and Marilu RN.    Head Edema and Incision - left  Ears WDL  Nose WDL  Mouth WDL  Neck WDL  Breast/Chest Redness and Blanching  Shoulder Blades WDL  Spine WDL  (R) Arm/Elbow/Hand WDL  (L) Arm/Elbow/Hand WDL  Abdomen WDL  Groin WDL  Scrotum/Coccyx/Buttocks WDL  (R) Leg WDL  (L) Leg WDL  (R) Heel/Foot/Toe WDL  (L) Heel/Foot/Toe WDL          Devices In Places ECG, Tele Box, Blood Pressure Cuff, Pulse Ox, Rivas, Arterial Line, SCD's, and Nasal Cannula      Interventions In Place Gray Ear Foams, TAP System, Pillows, Q2 Turns, Low Air Loss Mattress, ZFlo Pillow, and Heels Loaded W/Pillows    Possible Skin Injury No    Pictures Uploaded Into Epic N/A  Wound Consult Placed N/A  RN Wound Prevention Protocol Ordered Yes

## 2024-03-15 NOTE — PROGRESS NOTES
Critical Care Progress Note    Date of admission  3/12/2024    Chief Complaint  44 y.o. male who was admitted on 3/12 as a transfer from an outside hospital where he was found to have a brain mass.  Symptoms included right-sided weakness and numbness.  On 3/14 he underwent left retrosigmoid craniotomy for resection of of vestibular schwannoma    Hospital Course  3/12 - Admitted to the hospital floor    3/14 - Admitted to ICU post vestibular schwannoma resection    3/15 - Doing very well, ok for floor    Interval Problem Update  Reviewed last 24 hour events:  Tmax: Afebrile  Diet: Regular  Vasopressors: None    Infusions:   NS    Antibx:   Perioperative Ancef    Intake / Output  Urine Output past 24 hours: 1250 mL  Net admission: +2 L    Lab Trends  WBC 8 --> 22    BMP WNL    Review of Systems  Review of Systems   Constitutional:  Negative for chills, fever and malaise/fatigue.   HENT:  Negative for congestion and sore throat.    Eyes: Negative.    Respiratory:  Negative for sputum production and shortness of breath.    Cardiovascular:  Negative for chest pain and palpitations.   Gastrointestinal:  Negative for abdominal pain, nausea and vomiting.   Genitourinary: Negative.    Musculoskeletal: Negative.    Skin: Negative.    Neurological:  Positive for weakness and headaches. Negative for focal weakness.   All other systems reviewed and are negative.       Vital Signs for last 24 hours   Temp:  [36.1 °C (97 °F)-36.8 °C (98.3 °F)] 36.8 °C (98.3 °F)  Pulse:  [51-80] 61  Resp:  [8-27] 21  BP: (102-139)/(52-83) 116/71  SpO2:  [91 %-97 %] 93 %    Hemodynamic parameters for last 24 hours       Respiratory Information for the last 24 hours       Physical Exam   Physical Exam  Vitals and nursing note reviewed. Exam conducted with a chaperone present.   Constitutional:       General: He is not in acute distress.     Appearance: Normal appearance. He is not ill-appearing.   HENT:      Head: Normocephalic.      Mouth/Throat:       Mouth: Mucous membranes are moist.   Eyes:      Extraocular Movements: Extraocular movements intact.   Cardiovascular:      Rate and Rhythm: Normal rate and regular rhythm.      Pulses: Normal pulses.   Pulmonary:      Effort: Pulmonary effort is normal. No respiratory distress.   Abdominal:      General: There is no distension.      Palpations: Abdomen is soft.      Tenderness: There is no abdominal tenderness. There is no guarding or rebound.   Musculoskeletal:         General: Normal range of motion.      Cervical back: Normal range of motion and neck supple.   Skin:     General: Skin is warm and dry.      Capillary Refill: Capillary refill takes less than 2 seconds.   Neurological:      General: No focal deficit present.      Mental Status: He is alert and oriented to person, place, and time. Mental status is at baseline.         Medications  Current Facility-Administered Medications   Medication Dose Route Frequency Provider Last Rate Last Admin    acetaminophen (Tylenol) suppository 650 mg  650 mg Rectal Q6HRS Jessi L. Latona   650 mg at 03/15/24 0507    hydrALAZINE (Apresoline) injection 10 mg  10 mg Intravenous Q HOUR PRN Jessi L. Latona        labetalol (Normodyne/Trandate) injection 10 mg  10 mg Intravenous Q HOUR PRN Jessi L. Latona        Pharmacy Consult Request ...Pain Management Review 1 Each  1 Each Other PHARMACY TO DOSE Brittany Ceballos MD ALERT...DO NOT ADMINISTER NSAIDS or ASPIRIN unless ORDERED By Neurosurgery 1 Each  1 Each Other PRN Brittany Ceballos        ondansetron (Zofran) syringe/vial injection 4 mg  4 mg Intravenous Q4HRS PRN Brittany Ceballos   4 mg at 03/15/24 0941    dexamethasone (Decadron) injection 4 mg  4 mg Intravenous Once PRN Brittany Ceballos        diphenhydrAMINE (Benadryl) injection 25 mg  25 mg Intravenous Q6HRS PRN Brittany Ceballos        scopolamine (Transderm-Scop) patch 1 Patch  1 Patch Transdermal Q72HRS PRN Brittany Ceballos        cloNIDine (Catapres) tablet 0.1 mg  0.1 mg Oral  Q4HRS PRN Summer E Tucker        docusate sodium (Colace) capsule 100 mg  100 mg Oral BID Summer E Tucker        senna-docusate (Pericolace Or Senokot S) 8.6-50 MG per tablet 1 Tablet  1 Tablet Oral Nightly Summer E Cruz        senna-docusate (Pericolace Or Senokot S) 8.6-50 MG per tablet 1 Tablet  1 Tablet Oral Q24HRS PRN Summer E Tucker        polyethylene glycol/lytes (Miralax) Packet 1 Packet  1 Packet Oral BID PRN Summer E Tucker        magnesium hydroxide (Milk Of Magnesia) suspension 30 mL  30 mL Oral QDAY PRN Summer E Tucker        bisacodyl (Dulcolax) suppository 10 mg  10 mg Rectal Q24HRS PRN Summer E Tucker        sodium phosphate (Fleet) enema 133 mL  1 Each Rectal Once PRN Summer E Tucker        artificial tears (Eye Lubricant) ophth ointment 1 Application  1 Application Both Eyes Q8HRS Summer E Tucker        NS infusion   Intravenous Continuous Summer E Tucker 100 mL/hr at 03/15/24 0912 Rate Verify at 03/15/24 0912    acetaminophen (Tylenol) tablet 1,000 mg  1,000 mg Oral Q6HRS Summer E Tucker        Followed by    [START ON 3/20/2024] acetaminophen (Tylenol) tablet 1,000 mg  1,000 mg Oral Q6HRS PRN Summer E Tucker        oxyCODONE immediate-release (Roxicodone) tablet 5 mg  5 mg Oral Q3HRS PRN Summer E Tucker        Or    oxyCODONE immediate release (Roxicodone) tablet 10 mg  10 mg Oral Q3HRS PRN Summer E Tucker        Or    morphine 4 MG/ML injection 4 mg  4 mg Intravenous Q3HRS PRN Summer E Tucker   4 mg at 03/15/24 0941    ceFAZolin (Ancef) in SW IV syringe 2 g  2 g Intravenous Q8HR Summer E Tucker   2 g at 03/15/24 0236    diphenhydrAMINE (Benadryl) tablet/capsule 25 mg  25 mg Oral Q6HRS PRN Summer E Tucker        Or    diphenhydrAMINE (Benadryl) injection 25 mg  25 mg Intravenous Q6HRS PRN Summer E Tucker        benzocaine-menthol (Cepacol) lozenge 1 Lozenge  1 Lozenge Mouth/Throat Q2HRS PRN Brittany Ceballos        bacitracin ointment   Topical BID Brittany Ceballos   Given at 03/15/24 0506    dexamethasone (Decadron) tablet 4 mg  4  mg Oral Q6HRS Brittany Ceballos        Or    dexamethasone (Decadron) injection 4 mg  4 mg Intravenous Q6HRS Brittany Ceballos   4 mg at 03/15/24 0507    niCARdipine (Cardene) 25 mg in  mL Standard Infusion  0-15 mg/hr Intravenous Continuous DESEAN Acevedo.DARINEL   Dose not Required at 03/14/24 2133    famotidine (Pepcid) injection 20 mg  20 mg Intravenous BID Adalid Christine D.OKasey   20 mg at 03/15/24 0507    LORazepam (Ativan) tablet 0.5 mg  0.5 mg Oral BID PRN PATRICIA PalaciosP.RKaseyNKasey   0.5 mg at 03/14/24 0707       Fluids    Intake/Output Summary (Last 24 hours) at 3/15/2024 1015  Last data filed at 3/15/2024 0912  Gross per 24 hour   Intake 4586.76 ml   Output 2275 ml   Net 2311.76 ml       Laboratory          Recent Labs     03/13/24  0715 03/15/24  0304   SODIUM 136 136   POTASSIUM 4.1 4.2   CHLORIDE 98 98   CO2 28 21   BUN 14 13   CREATININE 1.08 1.06   CALCIUM 9.3 8.2*     Recent Labs     03/13/24  0715 03/15/24  0304   ALTSGPT 66*  --    ASTSGOT 61*  --    ALKPHOSPHAT 101*  --    TBILIRUBIN 1.0  --    GLUCOSE 100* 146*     Recent Labs     03/13/24  0715 03/15/24  0304   WBC 7.6 21.6*   NEUTSPOLYS  --  88.90*   LYMPHOCYTES  --  5.50*   MONOCYTES  --  4.50   EOSINOPHILS  --  0.00   BASOPHILS  --  0.20   ASTSGOT 61*  --    ALTSGPT 66*  --    ALKPHOSPHAT 101*  --    TBILIRUBIN 1.0  --      Recent Labs     03/13/24  0715 03/13/24  1350 03/15/24  0304   RBC 5.50  --  4.85   HEMOGLOBIN 17.0  --  15.0   HEMATOCRIT 52.0  --  45.0   PLATELETCT 286  --  274   PROTHROMBTM  --  11.9*  --    APTT  --  31.2  --    INR  --  0.87  --        Imaging  CT:    Reviewed    Assessment/Plan  * Status post craniotomy  Assessment & Plan  Surgery date: 3/14  Surgeon: Dr. Villarreal  Left retorsigmoid craniotomy with tumor removal  Post-op plan:  Q4 neuro  -SBP <140  -Decadron 4 mg q6h   -started H2B for GI PPX while on decadron     Brain mass- (present on admission)  Assessment & Plan  Underwent surgery on 3/14  Pathology pending  - likely vestibular schwannoma    Tobacco abuse- (present on admission)  Assessment & Plan  Counseled cessation    Weakness  Assessment & Plan  Due to intracranial mass  Monitor exam postresection         VTE:   per nsgy  Ulcer: H2 Antagonist  Lines: None    I have performed a physical exam and reviewed and updated ROS and Plan today (3/15/2024). In review of yesterday's note (3/14/2024), there are no changes except as documented above.     Discussed patient condition and risk of morbidity and/or mortality with Family, RN, RT, Pharmacy, , Charge nurse / hot rounds, Patient, and neurosurgery    Ok to transfer patient out of ICU today. [facility] will sign off at transfer. Please call with questions.

## 2024-03-15 NOTE — DISCHARGE PLANNING
Case Management Discharge Planning    Chart reviewed and case discussed in ICU rounds:  45 yo male transferred to [facility] from [facility] emergency department and admitted on 3/12/2024 for management of brain mass.  POD#1 s/p left retrosigmoid craniotomy for resection of mass.    PT recommending post-acute placement, OT eval pending.    Patient with [facility] will require placement in his registered county ([facility]    Patient transferred out of ICU prior to CM assessment and DCP discussion.    Handoff given to floor CM for f/u.

## 2024-03-15 NOTE — PROGRESS NOTES
Neurosurgery Progress Note    Subjective:  Some headache and neck pain when moving side to side. Otherwise, states that numbness in right face, arm, leg is better.    Exam:  Eye Openin Eyes open spontaneously Motor Response: 6 Follows commands Verbal Response: 5 Oriented to person, place, and time Total: 15  Awake, alert, oriented to person, place and time.  Pupils equally round and reactive to light, 4 to 3mm.  Extraocular movements full.  Facial sensation intact bilaterally to light touch  Left face with mild asymmetry in forehead and orbicularis oris, HB2.  Palate rises symmetrically.  Tongue is midline.  Shoulder shrug 5/5.  Patient moves all 4 extremities with full strength equally.  Sensation intact to light touch in all 4 extremities.  Decreased hearing to finger rub on the left    Recent Labs     24  0715 03/15/24  0304   WBC 7.6 21.6*   RBC 5.50 4.85   HEMOGLOBIN 17.0 15.0   HEMATOCRIT 52.0 45.0   MCV 94.5 92.8   MCH 30.9 30.9   MCHC 32.7 33.3   RDW 46.8 44.9   PLATELETCT 286 274   MPV 9.5 9.5     Recent Labs     24  0715 03/15/24  0304   SODIUM 136 136   POTASSIUM 4.1 4.2   CHLORIDE 98 98   CO2 28 21   GLUCOSE 100* 146*   BUN 14 13   CREATININE 1.08 1.06   CALCIUM 9.3 8.2*     Recent Labs     24  1350   APTT 31.2   INR 0.87           Intake/Output                         24 07 - 03/15/24 0659 03/15/24 07 - 24 0659      6117-7560 Total  3461-9113 Total                 Intake    I.V.    2268.3 4268.3  318.5  -- 318.5    Volume (mL) (Lactated Ringers)  500 2500 -- -- --    Volume (mL) (electrolyte-A (Plasmalyte-A) infusion) -- 1000 1000 -- -- --    Volume (mL) (NS infusion) -- 768.3 768.3 318.5 -- 318.5    Total Intake  2268.3 4268.3 318.5 -- 318.5       Output    Urine  --   1975  260  -- 260    Urine -- 900 900 -- -- --    Output (mL) (Urethral Catheter Non-latex;Temperature probe 16 Fr.) -- 1075 1075 260 -- 260    Stool  --  -- --  --   -- --    Number of Times Stooled -- 0 x 0 x 0 x -- 0 x    Blood  --  300 300  --  -- --    Est. Blood Loss -- 300 300 -- -- --    Total Output -- 2275 2275 260 -- 260       Net I/O     2000 -6.7 1993.3 58.5 -- 58.5          Assessment and Plan:  POD#1 s/p left retrosigmoid craniotomy for resection of vestibular schwannoma    Doing well  OK for q4h neuro checks, transfer to floor  Dexamethasone 4mg q6h, d/c with Medrol dose pack  ADAT  PT/OT/OOB    Please call with questions.    Gita Villarreal M.D.     My total time spent caring for the patient on the day of the encounter was 35 minutes.   This does not include time spent on separately billable procedures/tests.

## 2024-03-15 NOTE — ANESTHESIA TIME REPORT
Anesthesia Start and Stop Event Times       Date Time Event    3/14/2024 1050 Ready for Procedure     1056 Anesthesia Start     2002 Anesthesia Stop          Responsible Staff  03/14/24      Name Role Begin End    Arturo Zavala M.D. Anesth 1056 1533    Grady Mitchell M.D. Anesth 1533 2002          Overtime Reason:  no overtime (within assigned shift)    Comments:

## 2024-03-15 NOTE — DISCHARGE PLANNING
1517  DPA sent SNF referrals to SNFs in [facility] as ins will only cover SNF that is located in [facility].

## 2024-03-16 LAB
ANION GAP SERPL CALC-SCNC: 12 MMOL/L (ref 7–16)
BUN SERPL-MCNC: 13 MG/DL (ref 8–22)
CALCIUM SERPL-MCNC: 8.8 MG/DL (ref 8.5–10.5)
CHLORIDE SERPL-SCNC: 99 MMOL/L (ref 96–112)
CO2 SERPL-SCNC: 25 MMOL/L (ref 20–33)
CREAT SERPL-MCNC: 0.94 MG/DL (ref 0.5–1.4)
ERYTHROCYTE [DISTWIDTH] IN BLOOD BY AUTOMATED COUNT: 45.7 FL (ref 35.9–50)
GFR SERPLBLD CREATININE-BSD FMLA CKD-EPI: 104 ML/MIN/1.73 M 2
GLUCOSE SERPL-MCNC: 124 MG/DL (ref 65–99)
HCT VFR BLD AUTO: 42.1 % (ref 42–52)
HGB BLD-MCNC: 14.3 G/DL (ref 14–18)
MCH RBC QN AUTO: 31.8 PG (ref 27–33)
MCHC RBC AUTO-ENTMCNC: 34 G/DL (ref 32.3–36.5)
MCV RBC AUTO: 93.6 FL (ref 81.4–97.8)
PLATELET # BLD AUTO: 262 K/UL (ref 164–446)
PMV BLD AUTO: 9.6 FL (ref 9–12.9)
POTASSIUM SERPL-SCNC: 4.3 MMOL/L (ref 3.6–5.5)
RBC # BLD AUTO: 4.5 M/UL (ref 4.7–6.1)
SODIUM SERPL-SCNC: 136 MMOL/L (ref 135–145)
WBC # BLD AUTO: 17.1 K/UL (ref 4.8–10.8)

## 2024-03-16 PROCEDURE — 85027 COMPLETE CBC AUTOMATED: CPT

## 2024-03-16 PROCEDURE — A9270 NON-COVERED ITEM OR SERVICE: HCPCS

## 2024-03-16 PROCEDURE — 99232 SBSQ HOSP IP/OBS MODERATE 35: CPT | Performed by: STUDENT IN AN ORGANIZED HEALTH CARE EDUCATION/TRAINING PROGRAM

## 2024-03-16 PROCEDURE — 80048 BASIC METABOLIC PNL TOTAL CA: CPT

## 2024-03-16 PROCEDURE — 770001 HCHG ROOM/CARE - MED/SURG/GYN PRIV*

## 2024-03-16 PROCEDURE — 700111 HCHG RX REV CODE 636 W/ 250 OVERRIDE (IP): Mod: JZ | Performed by: INTERNAL MEDICINE

## 2024-03-16 PROCEDURE — 700102 HCHG RX REV CODE 250 W/ 637 OVERRIDE(OP)

## 2024-03-16 PROCEDURE — 36415 COLL VENOUS BLD VENIPUNCTURE: CPT

## 2024-03-16 RX ADMIN — DOCUSATE SODIUM 100 MG: 100 CAPSULE, LIQUID FILLED ORAL at 05:26

## 2024-03-16 RX ADMIN — OXYCODONE HYDROCHLORIDE 10 MG: 10 TABLET ORAL at 11:26

## 2024-03-16 RX ADMIN — BACITRACIN ZINC: 500 OINTMENT TOPICAL at 18:12

## 2024-03-16 RX ADMIN — DEXAMETHASONE 4 MG: 4 TABLET ORAL at 05:26

## 2024-03-16 RX ADMIN — DOCUSATE SODIUM 100 MG: 100 CAPSULE, LIQUID FILLED ORAL at 18:10

## 2024-03-16 RX ADMIN — ACETAMINOPHEN 1000 MG: 500 TABLET, FILM COATED ORAL at 01:20

## 2024-03-16 RX ADMIN — FAMOTIDINE 20 MG: 10 INJECTION, SOLUTION INTRAVENOUS at 05:26

## 2024-03-16 RX ADMIN — DEXAMETHASONE 4 MG: 4 TABLET ORAL at 18:10

## 2024-03-16 RX ADMIN — DEXAMETHASONE 4 MG: 4 TABLET ORAL at 11:26

## 2024-03-16 RX ADMIN — BACITRACIN ZINC: 500 OINTMENT TOPICAL at 05:27

## 2024-03-16 RX ADMIN — DEXAMETHASONE 4 MG: 4 TABLET ORAL at 01:20

## 2024-03-16 RX ADMIN — MINERAL OIL AND WHITE PETROLATUM 1 APPLICATION: 30; 940 OINTMENT OPHTHALMIC at 21:09

## 2024-03-16 RX ADMIN — OXYCODONE HYDROCHLORIDE 10 MG: 10 TABLET ORAL at 02:21

## 2024-03-16 RX ADMIN — ACETAMINOPHEN 1000 MG: 500 TABLET, FILM COATED ORAL at 18:10

## 2024-03-16 RX ADMIN — ACETAMINOPHEN 1000 MG: 500 TABLET, FILM COATED ORAL at 05:26

## 2024-03-16 RX ADMIN — FAMOTIDINE 20 MG: 10 INJECTION, SOLUTION INTRAVENOUS at 18:10

## 2024-03-16 RX ADMIN — MINERAL OIL AND WHITE PETROLATUM 1 APPLICATION: 30; 940 OINTMENT OPHTHALMIC at 15:01

## 2024-03-16 RX ADMIN — MINERAL OIL AND WHITE PETROLATUM 1 APPLICATION: 30; 940 OINTMENT OPHTHALMIC at 05:27

## 2024-03-16 RX ADMIN — DOCUSATE SODIUM 50 MG AND SENNOSIDES 8.6 MG 1 TABLET: 8.6; 5 TABLET, FILM COATED ORAL at 21:09

## 2024-03-16 ASSESSMENT — ENCOUNTER SYMPTOMS
SHORTNESS OF BREATH: 0
WEAKNESS: 1
HEADACHES: 1
ABDOMINAL PAIN: 0
COUGH: 0
NAUSEA: 0
NERVOUS/ANXIOUS: 1
VOMITING: 0
TINGLING: 1
FEVER: 0

## 2024-03-16 ASSESSMENT — COGNITIVE AND FUNCTIONAL STATUS - GENERAL
CLIMB 3 TO 5 STEPS WITH RAILING: A LOT
DRESSING REGULAR LOWER BODY CLOTHING: A LITTLE
MOVING TO AND FROM BED TO CHAIR: A LITTLE
DAILY ACTIVITIY SCORE: 20
TURNING FROM BACK TO SIDE WHILE IN FLAT BAD: A LITTLE
TOILETING: A LITTLE
WALKING IN HOSPITAL ROOM: A LITTLE
HELP NEEDED FOR BATHING: A LITTLE
STANDING UP FROM CHAIR USING ARMS: A LITTLE
SUGGESTED CMS G CODE MODIFIER MOBILITY: CK
DRESSING REGULAR UPPER BODY CLOTHING: A LITTLE
SUGGESTED CMS G CODE MODIFIER DAILY ACTIVITY: CJ
MOBILITY SCORE: 17
MOVING FROM LYING ON BACK TO SITTING ON SIDE OF FLAT BED: A LITTLE

## 2024-03-16 ASSESSMENT — PAIN DESCRIPTION - PAIN TYPE
TYPE: SURGICAL PAIN
TYPE: SURGICAL PAIN
TYPE: ACUTE PAIN
TYPE: SURGICAL PAIN
TYPE: SURGICAL PAIN

## 2024-03-16 NOTE — PROGRESS NOTES
Hospital Medicine Daily Progress Note    Date of Service  3/16/2024    Chief Complaint  Porfirio Campoverde is a 44 y.o. male admitted 3/12/2024 with right sided weakness.    Hospital Course  Porfirio Campoverde is a 44 y.o. male who presented 3/12/2024 as a transfer from outside facility for brain mass.  Patient initially presented to [facility] emergency department complaining of 1 week history of numbness and tingling and weakness in his right upper and lower extremity.  He states that over the last few days, has progressively been getting worse.  Patient also endorses headache that has been on and off.  Denies any dizziness or blurred vision.  He does state that he has decreased hearing in his right ear.  CT head was obtained that showed no acute hemorrhage.  There is a suspected extra-axial mass along the left cerebellopontine angle.  Most likely meningioma.  However aggressive processes such as metastatic attic foci not excluded.  Recommending MRI of the brain  CTA head and neck was also performed, still showing a cerebellopontine angle lesion as described in the CT head.  Could possibly represent an acoustic neuroma as well.    3/13: MRI brain completed yesterday. Acoustic schwannoma visualized. Neurosurgery recommending surgical intervention. Planned for 3/14. NPO at [facility].   3/14: No acute overnight events. Afebrile, vitals stable and within normal limits. Planning for surgical intervention today for acoustic schwannoma. Currently NPO.   3/15 the patient complains of surgical site discomfort and pain, he has mild left face asymmetry, he is otherwise neurologically intact except for decreased hearing on the left,  Using the  line the patient was educated about current status and expectations,  Neurosurgical follow-up as noted  The patient is approved to leave the ICU, every 4 neurochecks,  Ongoing dexamethasone treatment, PT OT discharge planning    Interval Problem Update  In NAD this AM  Headache  better today  Stable vitals  On 2 L nasal cannula, titrate  Stable labs  Continue IV steroids, can switch to Medrol Dosepak on discharge  PT/OT recommending postacute care  Placed referral for SNF and PMR  Labs on a.m.    I have discussed this patient's plan of care and discharge plan at IDT rounds today with Case Management, Nursing, Nursing leadership, and other members of the IDT team.    Consultants/Specialty  neurosurgery  Critical care    Code Status  Full Code    Disposition  The patient is not medically cleared for discharge to home or a post-acute facility.  Anticipate discharge to: skilled nursing facility    I have placed the appropriate orders for post-discharge needs.    Review of Systems  Review of Systems   Constitutional:  Negative for fever.   HENT:  Positive for hearing loss.    Respiratory:  Negative for cough and shortness of breath.    Cardiovascular:  Negative for chest pain.   Gastrointestinal:  Negative for abdominal pain, nausea and vomiting.   Neurological:  Positive for tingling, weakness and headaches.   Psychiatric/Behavioral:  The patient is nervous/anxious.         Physical Exam  Temp:  [36.8 °C (98.2 °F)-37.5 °C (99.5 °F)] 37.2 °C (99 °F)  Pulse:  [59-76] 59  Resp:  [17-20] 17  BP: (114-127)/(55-77) 127/77  SpO2:  [92 %-97 %] 97 %    Physical Exam  Vitals and nursing note reviewed.   Constitutional:       General: He is not in acute distress.     Appearance: Normal appearance. He is not ill-appearing.   HENT:      Head:      Comments: Postsurgical status, incisions CDI  Cardiovascular:      Rate and Rhythm: Normal rate and regular rhythm.      Heart sounds: No murmur heard.  Pulmonary:      Effort: Pulmonary effort is normal. No respiratory distress.      Breath sounds: Normal breath sounds. No wheezing.   Abdominal:      General: Abdomen is flat. Bowel sounds are normal. There is no distension.      Palpations: Abdomen is soft.      Tenderness: There is no abdominal tenderness.    Skin:     General: Skin is warm and dry.   Neurological:      Mental Status: He is alert and oriented to person, place, and time.      Cranial Nerves: No cranial nerve deficit.      Sensory: Sensory deficit present.      Motor: Weakness present.      Comments: Slight left facial symmetry  Left-sided hearing decreased     Psychiatric:         Mood and Affect: Mood normal.         Behavior: Behavior normal.         Fluids  No intake or output data in the 24 hours ending 03/16/24 1018        Laboratory  Recent Labs     03/15/24  0304 03/16/24  0547   WBC 21.6* 17.1*   RBC 4.85 4.50*   HEMOGLOBIN 15.0 14.3   HEMATOCRIT 45.0 42.1   MCV 92.8 93.6   MCH 30.9 31.8   MCHC 33.3 34.0   RDW 44.9 45.7   PLATELETCT 274 262   MPV 9.5 9.6     Recent Labs     03/15/24  0304 03/16/24  0547   SODIUM 136 136   POTASSIUM 4.2 4.3   CHLORIDE 98 99   CO2 21 25   GLUCOSE 146* 124*   BUN 13 13   CREATININE 1.06 0.94   CALCIUM 8.2* 8.8     Recent Labs     03/13/24  1350   APTT 31.2   INR 0.87               Imaging  CT-HEAD W/O   Final Result         1.  Subarachnoid hemorrhage in the posterior fossa within the basal cisterns and along the surgical bed   2.  Layering subarachnoid hemorrhage along the bilateral tentorium.   3.  Pneumocephalus, within expected limits for postsurgical changes.         MR-BRAIN-WITH & W/O   Final Result   Addendum (preliminary) 1 of 1      1.  There is an approximately 31 x 22 x 26 cm sized enhancing lesion in    the left cerebellopontine angle with extension into the IAC consistent    with acoustic schwannoma. There is no labyrinthine extension. There is    mass effect upon the abe.   2.  No acute infarct or hemorrhage.      Final      1.  There is an approximately 31 x 22 x 26 cm sized enhancing lesion in the left cerebellopontine angle with extension into the IAC. There is mass effect upon the abe.   2.  No acute infarct or hemorrhage.         CT-CHEST,ABDOMEN,PELVIS WITH   Final Result      No mass or  adenopathy detected.      OUTSIDE IMAGES-CT HEAD   Final Result             Assessment/Plan  * Brain mass- (present on admission)  Assessment & Plan  MRI brain: approximately 31 x 22 x 26 cm sized enhancing lesion in the left cerebellopontine cistern with extension into the IAC. There is mass effect upon the abe.   - Neurosurgery following  - Resume regular diet after surgery  - Pain control post operatively as indicated     Tobacco abuse- (present on admission)  Assessment & Plan  History of, cessation advised    Status post craniotomy  Assessment & Plan  Residual pain,  Symptomatic care,  Neurosurgical follow-up    Decreased sensation  Assessment & Plan  See plan above     Weakness  Assessment & Plan  See plan above      VTE prophylaxis: SCDs    I have performed a physical exam and reviewed and updated ROS and Plan today (3/16/2024). In review of yesterday's note (3/15/2024), there are no changes except as documented above.    Please note that this dictation was created using voice recognition software. I have made every reasonable attempt to correct obvious errors, but I expect that there are errors of grammar and possibly context that I did not discover before finalizing the note.

## 2024-03-16 NOTE — CARE PLAN
The patient is Stable - Low risk of patient condition declining or worsening    Shift Goals  Clinical Goals: Increased ambulation, pain management   Patient Goals: Pain management  Family Goals: ISH    Progress made toward(s) clinical / shift goals:  Pt aaox4, pleasant. Still c/o headache. Surgical site looks C,D,I. No noted drainage. Old scabbing. Nausea seems better today, able to eat breakfast and take PO meds with no episodes of emesis afterwards. Will encourage ambulation through day with an attempt to walk in the hallway. Hourly rounding in place, fall precautions active. Call bell in reach      Problem: Knowledge Deficit - Standard  Goal: Patient and family/care givers will demonstrate understanding of plan of care, disease process/condition, diagnostic tests and medications  Outcome: Progressing     Problem: Pain - Standard  Goal: Alleviation of pain or a reduction in pain to the patient’s comfort goal  Outcome: Progressing     Problem: Skin Integrity  Goal: Skin integrity is maintained or improved  Outcome: Progressing     Problem: Psychosocial  Goal: Patient's ability to verbalize feelings about condition will improve  Outcome: Progressing     Problem: Respiratory  Goal: Patient will achieve/maintain optimum respiratory ventilation and gas exchange  Outcome: Progressing     Problem: Urinary Elimination  Goal: Establish and maintain regular urinary output  Outcome: Progressing     Problem: Bowel Elimination  Goal: Establish and maintain regular bowel function  Outcome: Progressing     Problem: Mobility  Goal: Patient's capacity to carry out activities will improve  Outcome: Progressing         Patient is not progressing towards the following goals:

## 2024-03-16 NOTE — OP REPORT
Neurosurgery Operative Note    Patient Name: Porfirio Campoverde MRN: [id number] YOB: 1980  Date of Surgery: 3/14/2024     SURGEON: Gita Villarreal MD    ASSISTANT: CHEMA Brown    PRE-OPERATIVE DIAGNOSIS:   Left cerebellopontine angle mass  Compression of brainstem    POST-OPERATIVE DIAGNOSIS:   Left cerebellopontine angle mass  2. Compression of brainstem           PROCEDURE:   Right extended retrosigmoid craniotomy for resection of left cerebellopontine angle vestibular schwannoma  Partial internal petrosectomy, decompression of the internal auditory canal  Use of intraoperative neuromonitoring CN V, VII, and IX, X, XI, XII  Use of intraoperative microscope           ANESTHESIA: GETA           ESTIMATED BLOOD LOSS: 300cc           DRAINS: none     FINDINGS: left vestibular schwannoma  Facial nerve stimulating at 0.1mA at brainstem at end of case           SPECIMENS: none          IMPLANTS: Asc[employer]ion titanium mini plates and screws           COMPLICATIONS: None apparent.     Operative Indications: The patient is a 59-year-old male who is presenting for treatment of a vestibular schwannoma that was discovered in the setting of work-up for acute onset right face, hemibody numbness for 1 week. The mass was 2.5cm in the left cerebellopontine angle with compression of the brainstem.  Due to the symptomatic nature of the lesion, craniotomy for resection was recommended.  The indications, benefits, alternatives and risks to the procedure were discussed with the patient, which included but were not limited to bleeding, infection, stroke, injury to nearby nerves and vessels, cerebrospinal fluid leak, new facial weakness or sensory changes, worsened pain, deafness, new extremity weakness difficulty swallowing, hoarseness, worsened imbalance or discoordination, coma and rarely, even death.  The patient was in agreement and wished to proceed.     Operative details:  The patient was identified in the  pre-operative holding area by perioperative staff by two forms of identification. The patient was brought to the operating room, induced under general anesthesia and intubated without complication. Antibiotics and Decadron were administered. A Rivas catheter, IV access and arterial line were placed by the Anesthesia team and nursing staff. Subcutaneous needles for intraoperative neuromonitoring were placed by the surgical monitoring team. The patient was positioned in the right lateral decubitus to position on a beanbag on the operating room table with the left arm draped over an arm sling.  The Down hip was padded.  All pressure points were padded. SCDs were on and functioning. The head was secured in a [place] headholder to 60lb of pressure, turned to the right with the left side up, and secured to the bed attachment with the asterion at the highest point in the surgical field. A C-shaped incision was planned approximately 6 cm posterior to the pinna.  The transverse sigmoid junction was approximated using the standard anatomical scalp landmarks. The scalp was clipped of hair, prepped with isopropyl alcohol 4x4 sponges and Chloroprep. The patient was draped in the usual sterile fashion. A time-out indicated the correct patient, procedure and side.  Baseline SSEP and MEPs were recorded.     Marcaine 0.5% with epinephrine 1:200,000 was injected subcutaneously along the incision. Incision was made with a 10-blade. Monopolar cautery was used to dissect through the subcutaneous tissue in the suprafascial plane to raise a cutaneous flap laterally over the ear.  The ear was padded with Ray-Malcolm's, and a wet Ray-Malcolm was placed over the scalp flap and retracted with fishhooks.  The suboccipital musculature was divided superior along the nuchal line, posteriorly along the edge of the incision, and laterally along the mastoid groove, then retracted inferiorly and dissected in subperiosteal plane.  This was secured with  fishhooks.        Scalp landmarks were again used to identify the transverse sigmoid junction.  A bur hole was created just inferior and posterior to this junction using a high-speed drill with an acIperia drill bit.  The epidural plane was then dissected with a RealRider dental instrument, the bur hole was expanded until the edges of the transverse and sigmoid sinuses were reached. The bur was then used to trough the bone along the posterior aspect of the sigmoid sinus. A B1 with footplate was used to turn a craniotomy along the inferior aspect of the transverse sinus and approximately 2-1/2 cm posteriorly, nearly to the foramen magnum.  The cranial flap was removed and soaked in saline on the back table.  Epidural hemostasis was achieved with thrombin Gelfoam, and bone wax was used to cover the mastoid air cells.      The microscope was then draped and brought into the field.  Under bright magnification, the inferior aspect of the dura was sharply incised with a 15 blade.  Half by 3 octaviano was used to enter the subdural space, and follow along the petrous dura to the lateral cerebellar medullary cistern.  Using microsurgical technique, the cistern was sharply opened with the apex arachnoid knife.  CSF was allowed to egress to allow relaxation of the cerebellum.  The dural flap was then opened with a 90 degree negrito blade, retracted laterally, wrapped in a wet cottonoid octaviano, and tacked with 4-0 Nurolon suture.  The lateral aspect of the cerebellum was covered with Surgicel and Telfa patties.  The lateral cerebellar medullary cistern was then reentered, and cranial nerve XI was identified, inferiorly displaced by the large cerebellopontine angle tumor.  The cerebellum was carefully mobilized along the arachnoid plane away from the tumor.  The dorsal surface of the tumor was stimulated at 2 mA without response.  The surface was coagulated with bipolar cautery, sharply opened to create a window, and biopsy specimen was  sent for frozen section.  This was consistent with vestibular schwannoma.  In a systematic fashion, the ultrasonic aspirator was used to carefully debulk the internal contents of the tumor, while intermittently stimulating the outside capsule superiorly, dorsally and inferiorly while resecting the tumor capsule.  This technique was systematically performed along the superior, lateral, and inferior poles of the tumor in alternating fashion. Once the superior, lateral, inferior poles were reached, the cranial nerves were carefully identified and there anatomical locations, protected, and dissected micro surgically from the tumor capsule.  CN VII was found at its origin at the brainstem, stimulated at 0.1mA.  The tumor was further debulked using ultrasonic aspirator.  Just after its exit from the brainstem, it began to splay and travel superiorly toward the pole of the tumor before turning and entering the IAC. Using very careful microsurgical technique with micro instruments, the nerve was protected and gently dissected off of the tumor capsule proximal to distal.  Attention was then turned to the drilling of the internal auditory canal.  The dura was cauterized with bipolar cautery, the dura was resected.  An ultrasonic aspirator with bone cutting bit was used to drill a portion of the petrous bone, about 180 degrees of the posterior aspect of the IAC.  There was no dura covering the tumor within the IAC, and the tumor was actually seen to be eroding through the petrous bone superior to the IAC.  Once exposure was satisfactory, micro instruments were used to carefully debulk the tumor within the IAC, and dissect it from the fundus.  The facial nerve was anatomically identified within the IAC in its normal anatomical location, stimulated at 0.1 mA, protected, and the tumor was dissected from it.  The dissection continued from a lateral to medial trajectory until the porous acousticus was reached.  The remaining small  portion of tumor at the porous acousticus was removed.  Additional tumor from the CPA was resected, carefully dissecting it from CN VII, which achieved a near total resection.  The facial nerve was stimulated at the brainstem at 0.1mA. The field was copiously irrigated with Ancef irrigation.  Balls of bone wax were used to fill in the air cells that were uncovered with the petrous bone drilling.  This was covered with Surgicel and Tisseel.  Meticulous hemostasis was achieved.  All of the patties were removed, as well as the Surgicel over the cerebellum.     The dura was reapproximated with a running 6-0 Gortex suture.  Gelfoam was placed in the epidural space.  Tisseel was applied to the epidural space.  The bone flap was secured to the skull with titanium mini-plates and screws.      The incision was closed in layers, interrupted 0-Vicryl for muscle and fascia, interrupted inverted 2-0 Vicryl for galea and staples for the skin.     The Gao headholder was removed and the patient was extubated, and taken to the recovery room in a stable condition.     All counts were correct x2.      CAITLYN Blum was present for all parts of the surgery, including the incision, exposure, critical portions of the procedure and closure.     A first assistant was required for assistance with exposure, tissue retraction, suctioning and irrigating the field during microsurgical dissection and closure.

## 2024-03-16 NOTE — DISCHARGE PLANNING
[facility] Transitional Care Coordination    Referral from:  Dr. Taya Wilde  Insurance Provider on Facesheet:  [employer]  Potential Rehab diagnosis:  Non-traumatic brain    Chart review indicates patient has ongoing medical management and therapy needs to possibly meet inpatient rehab facility criteria with the goal of returning to community.      D/C Support:  Brother    Physiatry to consult per protocol to assist with plan of care.  [employer] insurance not provider for [facility] - anticipate post acute services in-networl with patients [employer] insurance.  TCC will sign off.      Last Covid test:    Thank you for the referral.

## 2024-03-16 NOTE — CARE PLAN
The patient is Stable - Low risk of patient condition declining or worsening    Shift Goals  Clinical Goals: monitor nausea, neuro checks  Patient Goals: rest  Family Goals: ISH    Progress made toward(s) clinical / shift goals:    Problem: Knowledge Deficit - Standard  Goal: Patient and family/care givers will demonstrate understanding of plan of care, disease process/condition, diagnostic tests and medications  Outcome: Progressing     Problem: Pain - Standard  Goal: Alleviation of pain or a reduction in pain to the patient’s comfort goal  Outcome: Progressing     Problem: Skin Integrity  Goal: Skin integrity is maintained or improved  Outcome: Progressing     Problem: Urinary Elimination  Goal: Establish and maintain regular urinary output  Outcome: Progressing       Patient is not progressing towards the following goals:      Problem: Bowel Elimination  Goal: Establish and maintain regular bowel function  Outcome: Not Progressing   Last BM PTA

## 2024-03-16 NOTE — PROGRESS NOTES
Neurosurgery Progress Note    Subjective:  Some incisional pain  Some nausea and vomiting yesterday    Exam:  Eye Openin Eyes open spontaneously Motor Response: 6 Follows commands Verbal Response: 5 Oriented to person, place, and time Total: 15  Awake, alert, oriented to person, place and time.  Pupils equally round and reactive to light, 4 to 3mm.  Extraocular movements full.  Facial sensation intact bilaterally to light touch  Left face with mild asymmetry in forehead and orbicularis oris, HB2.  Palate rises symmetrically.  Tongue is midline.  Shoulder shrug 5/5.  Patient moves all 4 extremities with full strength equally.  Sensation intact to light touch in all 4 extremities.  Decreased hearing to finger rub on the left    Cranial incision clean dry and intact    Recent Labs     03/15/24  0304 24  0547   WBC 21.6* 17.1*   RBC 4.85 4.50*   HEMOGLOBIN 15.0 14.3   HEMATOCRIT 45.0 42.1   MCV 92.8 93.6   MCH 30.9 31.8   MCHC 33.3 34.0   RDW 44.9 45.7   PLATELETCT 274 262   MPV 9.5 9.6     Recent Labs     03/15/24  0304 24  0547   SODIUM 136 136   POTASSIUM 4.2 4.3   CHLORIDE 98 99   CO2 21 25   GLUCOSE 146* 124*   BUN 13 13   CREATININE 1.06 0.94   CALCIUM 8.2* 8.8                 Intake/Output                         03/15/24 0700 - 24 0659 24 0700 - 24 0659     [id number] 2181-2963 Total  8875-1358 Total                 Intake    I.V.  398.4  -- 398.4  --  -- --    Volume (mL) (NS infusion) 398.4 -- 398.4 -- -- --    Total Intake 398.4 -- 398.4 -- -- --       Output    Urine  260  -- 260  --  -- --    Number of Times Voided -- 2 x 2 x 1 x -- 1 x    Output (mL) ([REMOVED] Urethral Catheter Non-latex;Temperature probe 16 Fr. 24 1500) 260 -- 260 -- -- --    Stool  --  -- --  --  -- --    Number of Times Stooled 0 x -- 0 x -- -- --    Total Output 260 -- 260 -- -- --       Net I/O     138.4 -- 138.4 -- -- --          Assessment and Plan:  POD#2 s/p left retrosigmoid  craniotomy for resection of vestibular schwannoma    Doing well  OK for q4h neuro checks  Dexamethasone 4mg q6h, d/c with Medrol dose pack  ADAT  PT/OT/OOB, okay for discharge from neurosurgical standpoint once cleared by PT/OT    Please call with questions.    Gita Villarreal M.D.

## 2024-03-17 LAB
ANION GAP SERPL CALC-SCNC: 13 MMOL/L (ref 7–16)
BUN SERPL-MCNC: 14 MG/DL (ref 8–22)
CALCIUM SERPL-MCNC: 9.1 MG/DL (ref 8.5–10.5)
CHLORIDE SERPL-SCNC: 100 MMOL/L (ref 96–112)
CO2 SERPL-SCNC: 22 MMOL/L (ref 20–33)
CREAT SERPL-MCNC: 0.89 MG/DL (ref 0.5–1.4)
ERYTHROCYTE [DISTWIDTH] IN BLOOD BY AUTOMATED COUNT: 46.5 FL (ref 35.9–50)
GFR SERPLBLD CREATININE-BSD FMLA CKD-EPI: 110 ML/MIN/1.73 M 2
GLUCOSE SERPL-MCNC: 125 MG/DL (ref 65–99)
HCT VFR BLD AUTO: 46.3 % (ref 42–52)
HGB BLD-MCNC: 15.4 G/DL (ref 14–18)
MCH RBC QN AUTO: 31.3 PG (ref 27–33)
MCHC RBC AUTO-ENTMCNC: 33.3 G/DL (ref 32.3–36.5)
MCV RBC AUTO: 94.1 FL (ref 81.4–97.8)
PLATELET # BLD AUTO: 276 K/UL (ref 164–446)
PMV BLD AUTO: 9.7 FL (ref 9–12.9)
POTASSIUM SERPL-SCNC: 4.4 MMOL/L (ref 3.6–5.5)
RBC # BLD AUTO: 4.92 M/UL (ref 4.7–6.1)
SODIUM SERPL-SCNC: 135 MMOL/L (ref 135–145)
WBC # BLD AUTO: 16.7 K/UL (ref 4.8–10.8)

## 2024-03-17 PROCEDURE — 85027 COMPLETE CBC AUTOMATED: CPT

## 2024-03-17 PROCEDURE — 700102 HCHG RX REV CODE 250 W/ 637 OVERRIDE(OP)

## 2024-03-17 PROCEDURE — 80048 BASIC METABOLIC PNL TOTAL CA: CPT

## 2024-03-17 PROCEDURE — A9270 NON-COVERED ITEM OR SERVICE: HCPCS

## 2024-03-17 PROCEDURE — 36415 COLL VENOUS BLD VENIPUNCTURE: CPT

## 2024-03-17 PROCEDURE — 770001 HCHG ROOM/CARE - MED/SURG/GYN PRIV*

## 2024-03-17 PROCEDURE — 99232 SBSQ HOSP IP/OBS MODERATE 35: CPT | Performed by: STUDENT IN AN ORGANIZED HEALTH CARE EDUCATION/TRAINING PROGRAM

## 2024-03-17 PROCEDURE — 700111 HCHG RX REV CODE 636 W/ 250 OVERRIDE (IP): Mod: JZ | Performed by: INTERNAL MEDICINE

## 2024-03-17 PROCEDURE — 700111 HCHG RX REV CODE 636 W/ 250 OVERRIDE (IP)

## 2024-03-17 RX ADMIN — FAMOTIDINE 20 MG: 10 INJECTION, SOLUTION INTRAVENOUS at 06:37

## 2024-03-17 RX ADMIN — DEXAMETHASONE 4 MG: 4 TABLET ORAL at 17:24

## 2024-03-17 RX ADMIN — DEXAMETHASONE 4 MG: 4 TABLET ORAL at 00:51

## 2024-03-17 RX ADMIN — ACETAMINOPHEN 1000 MG: 500 TABLET, FILM COATED ORAL at 06:37

## 2024-03-17 RX ADMIN — ACETAMINOPHEN 1000 MG: 500 TABLET, FILM COATED ORAL at 13:50

## 2024-03-17 RX ADMIN — FAMOTIDINE 20 MG: 10 INJECTION, SOLUTION INTRAVENOUS at 17:24

## 2024-03-17 RX ADMIN — ACETAMINOPHEN 1000 MG: 500 TABLET, FILM COATED ORAL at 00:51

## 2024-03-17 RX ADMIN — DEXAMETHASONE 4 MG: 4 TABLET ORAL at 06:37

## 2024-03-17 RX ADMIN — BACITRACIN ZINC: 500 OINTMENT TOPICAL at 17:24

## 2024-03-17 RX ADMIN — MINERAL OIL AND WHITE PETROLATUM 1 APPLICATION: 30; 940 OINTMENT OPHTHALMIC at 06:37

## 2024-03-17 RX ADMIN — DOCUSATE SODIUM 100 MG: 100 CAPSULE, LIQUID FILLED ORAL at 17:24

## 2024-03-17 RX ADMIN — DEXAMETHASONE SODIUM PHOSPHATE 4 MG: 4 INJECTION INTRA-ARTICULAR; INTRALESIONAL; INTRAMUSCULAR; INTRAVENOUS; SOFT TISSUE at 12:00

## 2024-03-17 RX ADMIN — DOCUSATE SODIUM 50 MG AND SENNOSIDES 8.6 MG 1 TABLET: 8.6; 5 TABLET, FILM COATED ORAL at 20:02

## 2024-03-17 RX ADMIN — DOCUSATE SODIUM 100 MG: 100 CAPSULE, LIQUID FILLED ORAL at 06:37

## 2024-03-17 RX ADMIN — ACETAMINOPHEN 1000 MG: 500 TABLET, FILM COATED ORAL at 20:02

## 2024-03-17 RX ADMIN — BACITRACIN ZINC: 500 OINTMENT TOPICAL at 06:37

## 2024-03-17 ASSESSMENT — ENCOUNTER SYMPTOMS
FEVER: 0
COUGH: 0
TINGLING: 1
NERVOUS/ANXIOUS: 1
NAUSEA: 0
HEADACHES: 1
SHORTNESS OF BREATH: 0
ABDOMINAL PAIN: 0
VOMITING: 0
WEAKNESS: 1

## 2024-03-17 NOTE — PROGRESS NOTES
[facility] Medicine Daily Progress Note    Date of Service  3/17/2024    Chief Complaint  Porfirio Campoverde is a 44 y.o. male admitted 3/12/2024 with right sided weakness.    Hospital Course  Porfirio Campoverde is a 44 y.o. male who presented 3/12/2024 as a transfer from outside facility for brain mass.  Patient initially presented to [facility] emergency department complaining of 1 week history of numbness and tingling and weakness in his right upper and lower extremity.  He states that over the last few days, has progressively been getting worse.  Patient also endorses headache that has been on and off.  Denies any dizziness or blurred vision.  He does state that he has decreased hearing in his right ear.  CT head was obtained that showed no acute hemorrhage.  There is a suspected extra-axial mass along the left cerebellopontine angle.  Most likely meningioma.  However aggressive processes such as metastatic attic foci not excluded.  Recommending MRI of the brain  CTA head and neck was also performed, still showing a cerebellopontine angle lesion as described in the CT head.  Could possibly represent an acoustic neuroma as well.    3/13: MRI brain completed yesterday. Acoustic schwannoma visualized. Neurosurgery recommending surgical intervention. Planned for 3/14. NPO at [facility].   3/14: No acute overnight events. Afebrile, vitals stable and within normal limits. Planning for surgical intervention today for acoustic schwannoma. Currently NPO.   3/15 the patient complains of surgical site discomfort and pain, he has mild left face asymmetry, he is otherwise neurologically intact except for decreased hearing on the left,  Using the  line the patient was educated about current status and expectations,  Neurosurgical follow-up as noted  The patient is approved to leave the ICU, every 4 neurochecks,  Ongoing dexamethasone treatment, PT OT discharge planning    Interval Problem Update  In NAD this AM,  happy  Walking more  Stable vitals  Continue IV steroids, can switch to Medrol Dosepak on discharge  PT/OT recommending postacute care  Placed referral for SNF and PMR  Labs on a.m.    I have discussed this patient's plan of care and discharge plan at IDT rounds today with Case Management, Nursing, Nursing leadership, and other members of the IDT team.    Consultants/Specialty  neurosurgery  Critical care    Code Status  Full Code    Disposition  The patient is not medically cleared for discharge to home or a post-acute facility.  Anticipate discharge to: skilled nursing facility    I have placed the appropriate orders for post-discharge needs.    Review of Systems  Review of Systems   Constitutional:  Negative for fever.   HENT:  Positive for hearing loss.    Respiratory:  Negative for cough and shortness of breath.    Cardiovascular:  Negative for chest pain.   Gastrointestinal:  Negative for abdominal pain, nausea and vomiting.   Neurological:  Positive for tingling, weakness and headaches.   Psychiatric/Behavioral:  The patient is nervous/anxious.         Physical Exam  Temp:  [36.4 °C (97.6 °F)-37.3 °C (99.1 °F)] 37.3 °C (99.1 °F)  Pulse:  [67-78] 78  Resp:  [16-18] 18  BP: (129-144)/(71-96) 131/84  SpO2:  [94 %-97 %] 95 %    Physical Exam  Vitals and nursing note reviewed.   Constitutional:       General: He is not in acute distress.     Appearance: Normal appearance. He is not ill-appearing.   HENT:      Head:      Comments: Postsurgical status, incisions CDI  Cardiovascular:      Rate and Rhythm: Normal rate and regular rhythm.      Heart sounds: No murmur heard.  Pulmonary:      Effort: Pulmonary effort is normal. No respiratory distress.      Breath sounds: Normal breath sounds. No wheezing.   Abdominal:      General: Abdomen is flat. Bowel sounds are normal. There is no distension.      Palpations: Abdomen is soft.      Tenderness: There is no abdominal tenderness.   Skin:     General: Skin is warm and  dry.   Neurological:      Mental Status: He is alert and oriented to person, place, and time.      Cranial Nerves: No cranial nerve deficit.      Sensory: Sensory deficit present.      Motor: Weakness present.      Comments: Slight left facial symmetry  Left-sided hearing decreased     Psychiatric:         Mood and Affect: Mood normal.         Behavior: Behavior normal.         Fluids  No intake or output data in the 24 hours ending 03/17/24 1025        Laboratory  Recent Labs     03/15/24  0304 03/16/24  0547 03/17/24  0545   WBC 21.6* 17.1* 16.7*   RBC 4.85 4.50* 4.92   HEMOGLOBIN 15.0 14.3 15.4   HEMATOCRIT 45.0 42.1 46.3   MCV 92.8 93.6 94.1   MCH 30.9 31.8 31.3   MCHC 33.3 34.0 33.3   RDW 44.9 45.7 46.5   PLATELETCT 274 262 276   MPV 9.5 9.6 9.7     Recent Labs     03/15/24  0304 03/16/24  0547 03/17/24  0545   SODIUM 136 136 135   POTASSIUM 4.2 4.3 4.4   CHLORIDE 98 99 100   CO2 21 25 22   GLUCOSE 146* 124* 125*   BUN 13 13 14   CREATININE 1.06 0.94 0.89   CALCIUM 8.2* 8.8 9.1                     Imaging  CT-HEAD W/O   Final Result         1.  Subarachnoid hemorrhage in the posterior fossa within the basal cisterns and along the surgical bed   2.  Layering subarachnoid hemorrhage along the bilateral tentorium.   3.  Pneumocephalus, within expected limits for postsurgical changes.         MR-BRAIN-WITH & W/O   Final Result   Addendum (preliminary) 1 of 1      1.  There is an approximately 31 x 22 x 26 cm sized enhancing lesion in    the left cerebellopontine angle with extension into the IAC consistent    with acoustic schwannoma. There is no labyrinthine extension. There is    mass effect upon the abe.   2.  No acute infarct or hemorrhage.      Final      1.  There is an approximately 31 x 22 x 26 cm sized enhancing lesion in the left cerebellopontine angle with extension into the IAC. There is mass effect upon the abe.   2.  No acute infarct or hemorrhage.         CT-CHEST,ABDOMEN,PELVIS WITH   Final Result       No mass or adenopathy detected.      OUTSIDE IMAGES-CT HEAD   Final Result             Assessment/Plan  * Brain mass- (present on admission)  Assessment & Plan  MRI brain: approximately 31 x 22 x 26 cm sized enhancing lesion in the left cerebellopontine cistern with extension into the IAC. There is mass effect upon the abe.   - Neurosurgery following  - Resume regular diet after surgery  - Pain control post operatively as indicated     Tobacco abuse- (present on admission)  Assessment & Plan  History of, cessation advised    Status post craniotomy  Assessment & Plan  Residual pain,  Symptomatic care,  Neurosurgical follow-up    Decreased sensation  Assessment & Plan  See plan above     Weakness  Assessment & Plan  See plan above      VTE prophylaxis: SCDs    I have performed a physical exam and reviewed and updated ROS and Plan today (3/17/2024). In review of yesterday's note (3/16/2024), there are no changes except as documented above.    Please note that this dictation was created using voice recognition software. I have made every reasonable attempt to correct obvious errors, but I expect that there are errors of grammar and possibly context that I did not discover before finalizing the note.

## 2024-03-17 NOTE — CARE PLAN
The patient is Stable - Low risk of patient condition declining or worsening    Shift Goals  Clinical Goals: pain mangement, monitor neuro status  Patient Goals: sleep  Family Goals: ISH    Progress made toward(s) clinical / shift goals:    Problem: Pain - Standard  Goal: Alleviation of pain or a reduction in pain to the patient’s comfort goal  Outcome: Progressing     Problem: Skin Integrity  Goal: Skin integrity is maintained or improved  Outcome: Progressing     Problem: Hemodynamics  Goal: Patient's hemodynamics, fluid balance and neurologic status will be stable or improve  Outcome: Progressing     Problem: Respiratory  Goal: Patient will achieve/maintain optimum respiratory ventilation and gas exchange  Outcome: Progressing     Problem: Urinary Elimination  Goal: Establish and maintain regular urinary output  Outcome: Progressing     Problem: Mobility  Goal: Patient's capacity to carry out activities will improve  Outcome: Progressing     Problem: Neuro Status  Goal: Neuro status will remain stable or improve  Outcome: Progressing     Problem: Fall Risk  Goal: Patient will remain free from falls  Outcome: Progressing       Patient is not progressing towards the following goals:

## 2024-03-17 NOTE — PROGRESS NOTES
Neurosurgery Progress Note    Subjective:  No events overnight  Some incisional pain, controlled  Denies nausea/vomiting     Exam:  Eye Openin Eyes open spontaneously Motor Response: 6 Follows commands Verbal Response: 5 Oriented to person, place, and time Total: 15  Awake, alert, oriented to person, place and time.  Pupils equally round and reactive to light, 4 to 3mm.  Extraocular movements full.  Facial sensation intact bilaterally to light touch  Left face with mild asymmetry in forehead and orbicularis oris, HB2.  Palate rises symmetrically.  Tongue is midline.  Shoulder shrug 5/5.  Patient moves all 4 extremities with full strength equally.  Sensation intact to light touch in all 4 extremities.  Decreased hearing to finger rub on the left    Cranial incision clean dry and intact    Recent Labs     03/15/24  0304 24  0547 24  0545   WBC 21.6* 17.1* 16.7*   RBC 4.85 4.50* 4.92   HEMOGLOBIN 15.0 14.3 15.4   HEMATOCRIT 45.0 42.1 46.3   MCV 92.8 93.6 94.1   MCH 30.9 31.8 31.3   MCHC 33.3 34.0 33.3   RDW 44.9 45.7 46.5   PLATELETCT 274 262 276   MPV 9.5 9.6 9.7     Recent Labs     03/15/24  0304 24  0547 24  0545   SODIUM 136 136 135   POTASSIUM 4.2 4.3 4.4   CHLORIDE 98 99 100   CO2 21 25 22   GLUCOSE 146* 124* 125*   BUN 13 13 14   CREATININE 1.06 0.94 0.89   CALCIUM 8.2* 8.8 9.1                 Intake/Output                         24 0700 - 24 0659 24 07 - 24 0659      Total  Total                 Intake    Total Intake -- -- -- -- -- --       Output    Urine  --  -- --  --  -- --    Number of Times Voided 1 x 2 x 3 x -- -- --    Total Output -- -- -- -- -- --       Net I/O     -- -- -- -- -- --          Assessment and Plan:  POD#3 s/p left retrosigmoid craniotomy for resection of vestibular schwannoma    OK to dc home from neurosurgical standpoint  Will need to dc home with medrol dose pack  ADAT  PT/OT/OOB as much as  tolerated  Will follow-up in clinic in 2 weeks    Please call with any questions.    Brittany Ceballos

## 2024-03-17 NOTE — PROGRESS NOTES
Bedside report received, assessment completed    A&O x  4, pt calls appropriately, Persian Speaking   Mobility: Up with Self/ SBA, FWW  Fall Risk Assessment: low, bed alarm n/a, door notifications yes  Pain Assessment / Reassessment completed, medication provided per MAR  Diet: Regular  LDA:   IV Access: 18g BL arms, CDI/ flushed/ SL  GI/: bathroom void, + flatus, 3/17 pt stated BM  DVT Prophylaxis: CI, SCD on while in bed   Suleman Score: 21, Interventions per flow sheet  Procedures:    - 3/14 L- Craniotomy for sigmoid tumor  D/C Plan:    - pending consultation for possible rehab    - Pain mgt    Reviewed plan of care with patient, bed in lowest position and locked, pt resting comfortably now, call light within reach, all needs met at this time. Interventions will be executed per plan of care

## 2024-03-18 ENCOUNTER — PHARMACY VISIT (OUTPATIENT)
Dept: PHARMACY | Facility: MEDICAL CENTER | Age: 44
End: 2024-03-18
Payer: COMMERCIAL

## 2024-03-18 ENCOUNTER — PATIENT OUTREACH (OUTPATIENT)
Dept: SCHEDULING | Facility: IMAGING CENTER | Age: 44
End: 2024-03-18
Payer: COMMERCIAL

## 2024-03-18 ENCOUNTER — HOSPITAL ENCOUNTER (INPATIENT)
Facility: REHABILITATION | Age: 44
End: 2024-03-18
Attending: PHYSICAL MEDICINE & REHABILITATION | Admitting: PHYSICAL MEDICINE & REHABILITATION
Payer: COMMERCIAL

## 2024-03-18 VITALS
RESPIRATION RATE: 17 BRPM | DIASTOLIC BLOOD PRESSURE: 87 MMHG | HEART RATE: 74 BPM | HEIGHT: 70 IN | SYSTOLIC BLOOD PRESSURE: 126 MMHG | TEMPERATURE: 98.8 F | OXYGEN SATURATION: 96 % | WEIGHT: 186.95 LBS | BODY MASS INDEX: 26.76 KG/M2

## 2024-03-18 LAB
ANION GAP SERPL CALC-SCNC: 12 MMOL/L (ref 7–16)
BUN SERPL-MCNC: 18 MG/DL (ref 8–22)
CALCIUM SERPL-MCNC: 9.5 MG/DL (ref 8.5–10.5)
CHLORIDE SERPL-SCNC: 96 MMOL/L (ref 96–112)
CO2 SERPL-SCNC: 24 MMOL/L (ref 20–33)
CREAT SERPL-MCNC: 0.93 MG/DL (ref 0.5–1.4)
ERYTHROCYTE [DISTWIDTH] IN BLOOD BY AUTOMATED COUNT: 45.2 FL (ref 35.9–50)
GFR SERPLBLD CREATININE-BSD FMLA CKD-EPI: 105 ML/MIN/1.73 M 2
GLUCOSE SERPL-MCNC: 128 MG/DL (ref 65–99)
HCT VFR BLD AUTO: 45.8 % (ref 42–52)
HGB BLD-MCNC: 15.7 G/DL (ref 14–18)
MCH RBC QN AUTO: 31.3 PG (ref 27–33)
MCHC RBC AUTO-ENTMCNC: 34.3 G/DL (ref 32.3–36.5)
MCV RBC AUTO: 91.4 FL (ref 81.4–97.8)
PLATELET # BLD AUTO: 322 K/UL (ref 164–446)
PMV BLD AUTO: 9.5 FL (ref 9–12.9)
POTASSIUM SERPL-SCNC: 3.9 MMOL/L (ref 3.6–5.5)
RBC # BLD AUTO: 5.01 M/UL (ref 4.7–6.1)
SODIUM SERPL-SCNC: 132 MMOL/L (ref 135–145)
WBC # BLD AUTO: 14.2 K/UL (ref 4.8–10.8)

## 2024-03-18 PROCEDURE — 97535 SELF CARE MNGMENT TRAINING: CPT

## 2024-03-18 PROCEDURE — 97530 THERAPEUTIC ACTIVITIES: CPT

## 2024-03-18 PROCEDURE — A9270 NON-COVERED ITEM OR SERVICE: HCPCS

## 2024-03-18 PROCEDURE — 700102 HCHG RX REV CODE 250 W/ 637 OVERRIDE(OP)

## 2024-03-18 PROCEDURE — 700111 HCHG RX REV CODE 636 W/ 250 OVERRIDE (IP): Mod: JZ | Performed by: INTERNAL MEDICINE

## 2024-03-18 PROCEDURE — 99239 HOSP IP/OBS DSCHRG MGMT >30: CPT | Performed by: STUDENT IN AN ORGANIZED HEALTH CARE EDUCATION/TRAINING PROGRAM

## 2024-03-18 PROCEDURE — 97116 GAIT TRAINING THERAPY: CPT

## 2024-03-18 PROCEDURE — 80048 BASIC METABOLIC PNL TOTAL CA: CPT

## 2024-03-18 PROCEDURE — 85027 COMPLETE CBC AUTOMATED: CPT

## 2024-03-18 PROCEDURE — RXMED WILLOW AMBULATORY MEDICATION CHARGE: Performed by: STUDENT IN AN ORGANIZED HEALTH CARE EDUCATION/TRAINING PROGRAM

## 2024-03-18 RX ORDER — ACETAMINOPHEN 500 MG
1000 TABLET ORAL 3 TIMES DAILY
Qty: 30 TABLET | Refills: 0 | Status: SHIPPED | OUTPATIENT
Start: 2024-03-18

## 2024-03-18 RX ORDER — ACETAMINOPHEN 500 MG
1000 TABLET ORAL 3 TIMES DAILY
Status: DISCONTINUED | OUTPATIENT
Start: 2024-03-18 | End: 2024-03-18 | Stop reason: HOSPADM

## 2024-03-18 RX ORDER — METHYLPREDNISOLONE 4 MG/1
TABLET ORAL
Qty: 21 TABLET | Refills: 0 | Status: SHIPPED | OUTPATIENT
Start: 2024-03-18

## 2024-03-18 RX ADMIN — DEXAMETHASONE 4 MG: 4 TABLET ORAL at 00:02

## 2024-03-18 RX ADMIN — DOCUSATE SODIUM 100 MG: 100 CAPSULE, LIQUID FILLED ORAL at 04:52

## 2024-03-18 RX ADMIN — FAMOTIDINE 20 MG: 10 INJECTION, SOLUTION INTRAVENOUS at 04:52

## 2024-03-18 RX ADMIN — DEXAMETHASONE 4 MG: 4 TABLET ORAL at 12:28

## 2024-03-18 RX ADMIN — MINERAL OIL AND WHITE PETROLATUM 1 APPLICATION: 30; 940 OINTMENT OPHTHALMIC at 04:52

## 2024-03-18 RX ADMIN — MAGNESIUM HYDROXIDE 30 ML: 1200 LIQUID ORAL at 02:52

## 2024-03-18 RX ADMIN — BACITRACIN ZINC: 500 OINTMENT TOPICAL at 04:52

## 2024-03-18 RX ADMIN — DEXAMETHASONE 4 MG: 4 TABLET ORAL at 04:52

## 2024-03-18 RX ADMIN — POLYETHYLENE GLYCOL 3350 1 PACKET: 17 POWDER, FOR SOLUTION ORAL at 04:52

## 2024-03-18 RX ADMIN — ACETAMINOPHEN 1000 MG: 500 TABLET, FILM COATED ORAL at 01:36

## 2024-03-18 ASSESSMENT — COGNITIVE AND FUNCTIONAL STATUS - GENERAL
MOBILITY SCORE: 22
TOILETING: A LITTLE
HELP NEEDED FOR BATHING: A LITTLE
DRESSING REGULAR UPPER BODY CLOTHING: A LITTLE
DRESSING REGULAR LOWER BODY CLOTHING: A LITTLE
DAILY ACTIVITIY SCORE: 20
WALKING IN HOSPITAL ROOM: A LITTLE
CLIMB 3 TO 5 STEPS WITH RAILING: A LITTLE
SUGGESTED CMS G CODE MODIFIER DAILY ACTIVITY: CJ
SUGGESTED CMS G CODE MODIFIER MOBILITY: CJ

## 2024-03-18 ASSESSMENT — GAIT ASSESSMENTS
GAIT LEVEL OF ASSIST: STANDBY ASSIST
DISTANCE (FEET): 175
DEVIATION: BRADYKINETIC
ASSISTIVE DEVICE: FRONT WHEEL WALKER

## 2024-03-18 ASSESSMENT — PAIN DESCRIPTION - PAIN TYPE: TYPE: ACUTE PAIN

## 2024-03-18 ASSESSMENT — FIBROSIS 4 INDEX: FIB4 SCORE: 0.98

## 2024-03-18 NOTE — PROGRESS NOTES
Pt has meds to beds.   His brother will be here to get him in 30 to 40 minutes  Pt is dressed and PIV is removed.   Discharge paperwork completed with him and  at the bedside.

## 2024-03-18 NOTE — THERAPY
"Occupational Therapy  Daily Treatment     Patient Name: Porfirio Campoverde  Age:  44 y.o., Sex:  male  Medical Record #: [id number]  Today's Date: 3/18/2024     Precautions  Precautions: (P) Fall Risk    Assessment    Pt is progressing as expected and is now functioning at an overall SBA-CGA level for his ADLs and related functional mobility. Performed all functional mobility with SBA to [facility]. Pt reports his son and wife both work but their schedules allow one of them to almost always be home with him and that they can assist as needed. Recommend home with home health, or if pt does not qualify for home health, recommend outpatient OT follow-up to address higher level deficits.     Plan    Treatment Plan Status: (P) Continue Current Treatment Plan  Type of Treatment: Self Care / Activities of Daily Living, Therapeutic Activity  Treatment Frequency: 4 Times per Week  Treatment Duration: Until Therapy Goals Met    DC Equipment Recommendations: (P) Tub / Shower Seat  Discharge Recommendations: (P) Recommend home health for continued occupational therapy services    Subjective    \"My son and wife can help me.\"     Objective     03/18/24 0910    Services   Is patient using  services for this encounter? Yes   Language Interpreted Maltese    Name [id number]    Mode iPad   Refusal signed by patient? No   Content of Interpretation (select all) Patient Education  (OT tx)   Precautions   Precautions Fall Risk   Vitals   Pulse 74   Patient BP Position Supine   Blood Pressure 126/87   Pain   Intervention Declines   Non Verbal Descriptors   Non Verbal Scale  Calm   Cognition    Cognition / Consciousness WDL   Level of Consciousness Alert   Comments Pleasant and cooperative   Active ROM Upper Body   Active ROM Upper Body  WDL   Strength Upper Body   Upper Body Strength  WDL   Comments Functional   Other Treatments   Other Treatments Provided Functional mobility in room and hallway with [facility]. " Discharge planning with pt discussing his home setup and support his family can provide   Balance   Sitting Balance (Static) Good   Sitting Balance (Dynamic) Good   Standing Balance (Static) Fair   Standing Balance (Dynamic) Fair -   Weight Shift Sitting Fair   Weight Shift Standing Fair   Skilled Intervention Verbal Cuing   Comments HHA   Bed Mobility    Supine to Sit Supervised   Scooting Supervised   Rolling Supervised   Comments HOB slightly elevated   Activities of Daily Living   Grooming Supervision;Standing   Upper Body Dressing Supervision   Lower Body Dressing Standby Assist   Toileting   (declined need)   Skilled Intervention Verbal Cuing   How much help from another person does the patient currently need...   Putting on and taking off regular lower body clothing? 3   Bathing (including washing, rinsing, and drying)? 3   Toileting, which includes using a toilet, bedpan, or urinal? 3   Putting on and taking off regular upper body clothing? 3   Taking care of personal grooming such as brushing teeth? 4   Eating meals? 4   6 Clicks Daily Activity Score 20   Functional Mobility   Sit to Stand Standby Assist   Bed, Chair, Wheelchair Transfer Standby Assist   Toilet Transfers Standby Assist   Transfer Method Stand Step   Mobility EOB->bathroom->sink->otto->EOB/chair with SBA-HHA   Activity Tolerance   Sitting in Chair post   Sitting Edge of Bed 10 min   Standing 5+ min   Patient / Family Goals   Patient / Family Goal #1 to return home   Goal #1 Outcome Progressing as expected   Short Term Goals   Short Term Goal # 1 supervised with LB dressing   Goal Outcome # 1 Progressing as expected   Short Term Goal # 2 supervised with ADL txfs   Goal Outcome # 2 Progressing as expected   Education Group   Education Provided Home Safety;Transfers;Activities of Daily Living   Role of Occupational Therapist Patient Response Patient;Acceptance;Explanation;Verbal Demonstration   Home Safety Patient Response  Patient;Acceptance;Explanation;Verbal Demonstration  (recommendation for a shower chair and family support as needed)   Transfers Patient Response Patient;Acceptance;Explanation;Verbal Demonstration;Action Demonstration   ADL Patient Response Patient;Acceptance;Explanation;Verbal Demonstration;Action Demonstration;Reinforcement Needed   Occupational Therapy Treatment Plan    O.T. Treatment Plan Continue Current Treatment Plan   Anticipated Discharge Equipment and Recommendations   DC Equipment Recommendations Tub / Shower Seat   Discharge Recommendations Recommend home health for continued occupational therapy services   Interdisciplinary Plan of Care Collaboration   IDT Collaboration with  Nursing;Physical Therapist   Patient Position at End of Therapy Seated;Chair Alarm On;Call Light within Reach;Tray Table within Reach   Collaboration Comments RN updated; handoff to PT   Session Information   Date / Session Number  3/18 #2 (2/4, 3/21)

## 2024-03-18 NOTE — DISCHARGE PLANNING
Care Transition Team Assessment  This RN CM used  services with patient A/Ox4 confirmed demographics and insurance information coverage. Patient Lives in a [facility] in [place] for the last 4 months, no PCP established.   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  Discussed plan for discharge and therapies recommendations with patient. Per Porfirio he would like to go home with out-patient therapy. Updated Dr. Bain on discharge plan. Patient pending acceptance from [place] [facility].    Information Source  Orientation Level: Oriented X4  Information Given By: Patient  Informant's Name: Porfirio  Who is responsible for making decisions for patient? : Patient    Readmission Evaluation  Is this a readmission?: No    Elopement Risk  Legal Hold: No  Ambulatory or Self Mobile in Wheelchair: Yes  Disoriented: No  Psychiatric Symptoms: None  History of Wandering: No  Elopement this Admit: No  Vocalizing Wanting to Leave: No  Displays Behaviors, Body Language Wanting to Leave: No-Not at Risk for Elopement  Elopement Risk: Not at Risk for Elopement    Interdisciplinary Discharge Planning  Lives with - Patient's Self Care Capacity: Spouse, Parents (children 19-6 years old)  Patient or legal guardian wants to designate a caregiver: No  Support Systems: Family Member(s)  Housing / Facility: 1 Story House  Durable Medical Equipment: Not Applicable    Discharge Preparedness  What is your plan after discharge?: Home with help  What are your discharge supports?: Spouse, Sibling  Prior Functional Level: Ambulatory  Difficulity with ADLs: None  Difficulity with IADLs: None    Functional Assesment  Prior Functional Level: Ambulatory    Finances  Financial Barriers to Discharge: No  Prescription Coverage: Yes    Vision / Hearing Impairment  Vision Impairment : No  Hearing Impairment : Yes  Hearing Impairment: Right Ear, Hearing  Device Not Available  Does Pt Need Special Equipment for the Hearing Impaired?: No      Advance Directive  Advance Directive?: None    Domestic Abuse  Have you ever been the victim of abuse or violence?: No  Physical Abuse or Sexual Abuse: No  Verbal Abuse or Emotional Abuse: No  Possible Abuse/Neglect Reported to:: Not Applicable    Psychological Assessment  History of Substance Abuse: None  History of Psychiatric Problems: No  Non-compliant with Treatment: No  Newly Diagnosed Illness: Yes    Discharge Risks or Barriers  Discharge risks or barriers?: No PCP  Patient risk factors: Language barrier, No PCP, Vulnerable adult    Anticipated Discharge Information  Discharge Disposition: Discharged to home/self care (01)  Discharge Address: [address] [place] [id number]  Discharge Contact Phone Number: [phone]

## 2024-03-18 NOTE — CARE PLAN
The patient is Stable - Low risk of patient condition declining or worsening    Shift Goals  Clinical Goals: Safety  Patient Goals: Sleep  Family Goals: jhon    Progress made toward(s) clinical / shift goals:      Problem: Hemodynamics  Goal: Patient's hemodynamics, fluid balance and neurologic status will be stable or improve  Description: Target End Date:  Prior to discharge or change in level of care    Document on Assessment and I/O flowsheet templates    1.  Monitor vital signs, pulse oximetry and cardiac monitor per provider order and/or policy  2.  Maintain blood pressure per provider order  3.  Hemodynamic monitoring per provider order  4.  Manage IV fluids and IV infusions  5.  Monitor intake and output  6.  Daily weights per unit policy or provider order  7.  Assess peripheral pulses and capillary refill  8.  Assess color and body temperature  9.  Position patient for maximum circulation/cardiac output  10. Monitor for signs/symptoms of excessive bleeding  11. Assess mental status, restlessness and changes in level of consciousness  12. Monitor temperature and report fever or hypothermia to provider immediately. Consideration of targeted temperature management.  Outcome: Progressing  Note: Pt hemodynamic and neurological status stable; pt responses appropriate; pt makes trip to BR w/ walker supervised approx Q2 hours; pt steady w/ 4WW     Problem: Bowel Elimination  Goal: Establish and maintain regular bowel function  Description: Target End Date:  Prior to discharge or change in level of care    1.   Note date of last BM  2.   Educate about diet, fluid intake, medication and activity to promote bowel function  3.   Educate signs and symptoms of constipation and interventions to implement  4.   Pharmacologic bowel management per provider order  5.   Regular toileting schedule  6.   Upright position for toileting  7.   High fiber diet  8.   Encourage hydration  9.   Collaborate with Clinical Dietician  10.  Care and maintenance of ostomy if applicable  Outcome: Progressing  Note: Pt has had several incomplete evacuations of soft form stool today

## 2024-03-18 NOTE — DISCHARGE SUMMARY
Discharge Summary    CHIEF COMPLAINT ON ADMISSION  No chief complaint on file.      Reason for Admission  BRAIN MASS     Admission Date  3/12/2024    CODE STATUS  Full Code    HPI & HOSPITAL COURSE  44 y.o. male who presented 3/12/2024 as a transfer from outside facility for brain mass.  Patient initially presented to [facility] emergency department complaining of 1 week history of numbness and tingling and weakness in his right upper and lower extremity.  He states that over the last few days, has progressively been getting worse.  Patient also endorses headache that has been on and off.  Denies any dizziness or blurred vision.  He does state that he has decreased hearing in his right ear.     CT head was obtained that showed no acute hemorrhage.  There is a suspected extra-axial mass along the left cerebellopontine angle. Most likely meningioma.  However aggressive processes such as metastatic attic foci not excluded.  MRI showed 31 x 22 x 26 cm sized enhancing lesion in the left cerebellopontine angle with extension into the IAC along with mass effect. Neurosurgery following who evaluated imaging and agreed mass consistent with left vestibular schwannoma.  Ultimately, patient underwent left retrosigmoid craniotomy for resection of mass which was sent to pathology, results pending.  And required ICU transfer on IV steroids.  Patient had marked improvement with headache as well as weakness and was ultimately transferred to the medical floor.  Patient tolerating meals and had marked improvement with ambulation.  He patient was eval by physical therapy and Occupational Therapy who recommended continuation of therapy with home health.  So patient was in car condition was discharged home on a Medrol pack as per neurosurgery's recommendation and was instructed to follow-up with his primary care provider and neurosurgery in the outpatient setting.  Additionally referral was placed for cancer navigator to assist with  outpatient follow-up.  All results and plan of action discussed with patient for she was understanding and agreement with the primary care team.  Patient was instructed to return to measure primary symptoms were to worsen.    Therefore, he is discharged in good and stable condition to home with organized home healthcare and close outpatient follow-up.    The patient met 2-midnight criteria for an inpatient stay at the time of discharge.    Discharge Date  3/18/2024    FOLLOW UP ITEMS POST DISCHARGE  Primary care provider follow-up with hospital care  Primary care provider to follow OR path report  If schwannoma patient will likely need yearly CNS imaging to monitor and oncology follow-up    DISCHARGE DIAGNOSES  Principal Problem:    Brain mass (POA: Yes)  Active Problems:    Weakness (POA: Unknown)    Status post craniotomy (POA: No)    Tobacco abuse (POA: Yes)  Resolved Problems:    * No resolved hospital problems. *      FOLLOW UP  No future appointments.  No follow-up provider specified.    MEDICATIONS ON DISCHARGE     Medication List        ASK your doctor about these medications        Instructions   acetaminophen 325 MG Tabs  Commonly known as: Tylenol   Take 650 mg by mouth every four hours as needed for Mild Pain or Moderate Pain.  Dose: 650 mg              Allergies  No Known Allergies    DIET  Orders Placed This Encounter   Procedures    Diet Order Diet: Regular     Standing Status:   Standing     Number of Occurrences:   1     Order Specific Question:   Diet:     Answer:   Regular [1]       ACTIVITY  As tolerated.  Weight bearing as tolerated    CONSULTATIONS  Neurosurgery  Critical care medicine    PROCEDURES  3/14/2024 Left retrosigmoid craniotomy for resection of mass     LABORATORY  Lab Results   Component Value Date    SODIUM 132 (L) 03/18/2024    POTASSIUM 3.9 03/18/2024    CHLORIDE 96 03/18/2024    CO2 24 03/18/2024    GLUCOSE 128 (H) 03/18/2024    BUN 18 03/18/2024    CREATININE 0.93 03/18/2024         Lab Results   Component Value Date    WBC 14.2 (H) 03/18/2024    HEMOGLOBIN 15.7 03/18/2024    HEMATOCRIT 45.8 03/18/2024    PLATELETCT 322 03/18/2024        Total time of the discharge process exceeds 33 minutes.

## 2024-03-18 NOTE — THERAPY
Physical Therapy   Daily Treatment     Patient Name: Porfirio Campoverde  Age:  44 y.o., Sex:  male  Medical Record #: [id number]  Today's Date: 3/18/2024     Precautions  Precautions: Fall Risk    Assessment  Pt seen for PT tx session with mobility detailed down below. Pt demonstrated improvement with all mobility and is now at SBA-SPV level with FWW for all standing activity. Pt educated on importance of using FWW for the short term, however he has progressed to the point that he should be able to safely DC home with outpatient PT follow and family support. Will follow.     Plan    Treatment Plan Status: Continue Current Treatment Plan  Type of Treatment: Bed Mobility, Equipment, Gait Training, Neuro Re-Education / Balance, Self Care / Home Evaluation, Stair Training, Therapeutic Activities, Therapeutic Exercise  Treatment Frequency: 5 Times per Week  Treatment Duration:      DC Equipment Recommendations: Front-Wheel Walker (order initiated)  Discharge Recommendations: Recommend outpatient physical therapy services to address higher level deficits        Vitals   O2 (LPM) 0   O2 Delivery Device None - Room Air   Pain 0 - 10 Group   Therapist Pain Assessment Post Activity Pain Same as Prior to Activity;0   Cognition    Cognition / Consciousness WDL   Balance   Sitting Balance (Static) Good   Sitting Balance (Dynamic) Good   Standing Balance (Static) Fair +   Standing Balance (Dynamic) Fair   Weight Shift Sitting Fair   Weight Shift Standing Fair   Skilled Intervention Verbal Cuing   Comments FWW in standing   Bed Mobility    Comments NT, in chair pre/post. at SPV level with OT earlier today   Gait Analysis   Gait Level Of Assist Standby Assist   Assistive Device Front Wheel Walker   Distance (Feet) 175   # of Times Distance was Traveled 1   Deviation Bradykinetic   # of Stairs Climbed 0   Weight Bearing Status no restrictions   Skilled Intervention Verbal Cuing   Comments pt initially ambulated without AD (narrow DEBO  and bradykinetic), improved with use of FWW   Functional Mobility   Sit to Stand Standby Assist   Bed, Chair, Wheelchair Transfer Standby Assist   Mobility FWW   6 Clicks Assessment - How much HELP from from another person do you currently need... (If the patient hasn't done an activity recently, how much help from another person do you think he/she would need if he/she tried?)   Turning from your back to your side while in a flat bed without using bedrails? 4   Moving from lying on your back to sitting on the side of a flat bed without using bedrails? 4   Moving to and from a bed to a chair (including a wheelchair)? 4   Standing up from a chair using your arms (e.g., wheelchair, or bedside chair)? 4   Walking in hospital room? 3   Climbing 3-5 steps with a railing? 3   6 clicks Mobility Score 22   Short Term Goals    Short Term Goal # 1 Pt will perform supine < > sit independent with bed flat, no rail in 6 visits in order to peform upright tasks.   Goal Outcome # 1 goal not met   Short Term Goal # 2 Pt will perform step pivot transfers mod I with LRAD in 6 visits in order to return home safely.   Goal Outcome # 2 Progressing as expected   Short Term Goal # 3 Pt will ambulate 150' with LRAD in 6 visits in order to return home safely.   Goal Outcome # 3 Progressing as expected   Short Term Goal # 4 Pt will ascend/ descend 2 steps with LRAD in 6 visits in order to return home safely.   Goal Outcome # 4 Goal not met   Education Group   Additional Comments discussed DC planning with pt and his family including safe mobility strategies, AD use, and outpatient follow up   Physical Therapy Treatment Plan   Physical Therapy Treatment Plan Continue Current Treatment Plan   Anticipated Discharge Equipment and Recommendations   DC Equipment Recommendations Front-Wheel Walker  (order initiated)   Discharge Recommendations Recommend outpatient physical therapy services to address higher level deficits   Interdisciplinary Plan of  Care Collaboration   IDT Collaboration with  Nursing;Family / Caregiver   Patient Position at End of Therapy Seated;Chair Alarm On;Call Light within Reach;Tray Table within Reach;Phone within Reach   Collaboration Comments RN updated   Session Information   Date / Session Number  3/18- 2 (2/5, 3/21)

## 2024-03-18 NOTE — PROGRESS NOTES
Porfirio Campoverde has been provided dc instructions by floor RN. Pt to DCL waiting for ride. Arm band removed. Medications given from pharmacy prior to arrival to DCL. Pt out of DCL at this time with personal belongings.

## 2024-03-18 NOTE — FACE TO FACE
Face to Face Note  -  Durable Medical Equipment    Silver Wilde M.D. - NPI: [id number]  I certify that this patient is under my care and that they had a durable medical equipment(DME)face to face encounter by myself that meets the physician DME face-to-face encounter requirements with this patient on:    Date of encounter:   Patient:                    MRN:                       YOB: 2022  Porfirio Campoverde  [id number]  1980     The encounter with the patient was in whole, or in part, for the following medical condition, which is the primary reason for durable medical equipment:  Post-Op Surgery    I certify that, based on my findings, the following durable medical equipment is medically necessary:    Walkers and Other DME Equipment - Tub / Shower Seat and front wheel walker .    My Clinical findings support the need for the above equipment due to:  Abnormal Gait

## 2024-03-18 NOTE — PROGRESS NOTES
Discharge paperwork reviewed at the bedside with inhouse  Anna. Pt asked questions and all questions were answered. Pt's ride should be here around 1600.

## 2024-03-18 NOTE — PROGRESS NOTES
Per Arelis from scheduling she will try and make a few calls up in [facility] to get the patient scheduled for a PCP after discharge.

## 2024-03-18 NOTE — PROGRESS NOTES
Pt having gas and frequent feeling like he needs to have a BM; multiple trips to BR without BM; pt is frustrated but very pleasant and cooperative

## 2024-03-20 ENCOUNTER — PATIENT OUTREACH (OUTPATIENT)
Dept: ONCOLOGY | Facility: MEDICAL CENTER | Age: 44
End: 2024-03-20
Payer: COMMERCIAL

## 2024-03-20 NOTE — PROGRESS NOTES
Name:  Ya   ID Number:  [id number]    Content Discussed:  Referral received, call placed to patient to follow up after discharge and verify he has scheduled appointments.  Initially call was picked up and then caller on other line hung up.  Called again with no answer and message that voice mail has not been set up.

## 2024-03-21 ENCOUNTER — PATIENT OUTREACH (OUTPATIENT)
Dept: ONCOLOGY | Facility: MEDICAL CENTER | Age: 44
End: 2024-03-21

## 2024-03-21 NOTE — PROGRESS NOTES
"Received call from Maegan Rouse, who requested .        Name:  Gilberto   ID Number:  [id number]    Content Discussed:  She reported was calling about surgery and discharge for patient.  Verified could speak with Maegan.  She provided information that patient does not have a pcp and they are not aware of any follow up appointments.  She is asking about follow up for \"stitches\" as well.  Maegan saying they were suppose to have received a call with follow up information.  Let her know will need to reach out to get needed information for appropriate follow up.    Call placed to scheduling as chart indicated that they were going to make a few calls in [facility] to get patient scheduled.  Found out information that patient is scheduled for a hospital follow up with Delfino Casper MD ([facility]) on Monday March 25, 2024 at 2:30 pm - per chart.    Call placed [facility], spoke with Lucy in scheduling.  Provided update on patient, that  is needed and best contact was Maegan Rouse.  Their office will reach out to provide appointment information.    Call placed to [facility], left message for Dr Villarreal's MA regarding patient and that they are not aware of any follow up at this time.  "

## (undated) DEVICE — ELECTRODE DUAL RETURN W/ CORD - (50/PK)

## (undated) DEVICE — SUTURE 2-0 VICRYL CT-2  8 X 18 INCH (12EA/BX)

## (undated) DEVICE — GOWN SURGEONS X-LARGE - DISP. (30/CA)

## (undated) DEVICE — TISSEEL 4ML ----MUST ORDER A MIN OF 6EA----

## (undated) DEVICE — SURGIFOAM (SIZE 100) - (6EA/CA)

## (undated) DEVICE — GLOVE SZ 7 BIOGEL PI MICRO - PF LF (50PR/BX 4BX/CA)

## (undated) DEVICE — SPONGE GAUZESTER 4 X 4 4PLY - (128PK/CA)

## (undated) DEVICE — SPONGE TELFA SURGICAL PATTIES 1/2IN X 3IN (10EA/PK 20PK/BX)

## (undated) DEVICE — CANISTER SUCTION 3000ML MECHANICAL FILTER AUTO SHUTOFF MEDI-VAC NONSTERILE LF DISP  (40EA/CA)

## (undated) DEVICE — STAY ELASTIC BLUNT RETRACTOR 12MM (8EA/PK)

## (undated) DEVICE — COVER LIGHT HANDLE ALC PLUS DISP (18EA/BX)

## (undated) DEVICE — TUBE CONNECT SUCTION CLEAR 120 X 1/4" (50EA/CA)"

## (undated) DEVICE — TRAY SURESTEP FOLEY TEMP SENSING 16FR (10EA/CA) ORDER  #18764 FOR TEMP FOLEY ONLY

## (undated) DEVICE — TUBE E-T HI-LO CUFF 7.5MM (10EA/PK)

## (undated) DEVICE — SUCTION INSTRUMENT YANKAUER BULBOUS TIP W/O VENT (50EA/CA)

## (undated) DEVICE — GLOVE BIOGEL SZ 8 SURGICAL PF LTX - (50PR/BX 4BX/CA)

## (undated) DEVICE — DRAPE 36X28IN RAD CARM BND BG - (25/CA) O

## (undated) DEVICE — SET EXTENSION WITH 2 PORTS (48EA/CA) ***PART #2C8610 IS A SUBSTITUTE*****

## (undated) DEVICE — INTRAOP NEURO IN OR 1:1 PER 15 MIN

## (undated) DEVICE — DRAPE MICROSCOPE ARMATEC 120IN X 46IN (10EA/CA)

## (undated) DEVICE — GOWN WARMING STANDARD FLEX - (30/CA)

## (undated) DEVICE — HEMOSTAT SURG ABSORBABLE - 4 X 8 IN SURGICEL (24EA/CA)

## (undated) DEVICE — DRAPE LARGE 3 QUARTER - (20/CA)

## (undated) DEVICE — TUBING C&T SET FLYING LEADS DRAIN TUBING (10EA/BX)

## (undated) DEVICE — SUTURE 0 VICRYL PLUS CT-1 - 8 X 18 INCH (12/BX)

## (undated) DEVICE — Device

## (undated) DEVICE — BLADE SURGICAL CLIPPER - (50EA/CA)

## (undated) DEVICE — SLEEVE, VASO, THIGH, MED

## (undated) DEVICE — GLOVE BIOGEL SZ 7 SURGICAL PF LTX - (50PR/BX 4BX/CA)

## (undated) DEVICE — GLOVE BIOGEL INDICATOR SZ 7SURGICAL PF LTX - (50/BX 4BX/CA)

## (undated) DEVICE — BONE WAX (12PK/BX)

## (undated) DEVICE — TIP ASPIRATOR SONOPET IQ STANDARD 12CM (4EA/PK)

## (undated) DEVICE — SYRINGE SAFETY 3 ML 18 GA X 1 1/2 BLUNT LL (100/BX 8BX/CA)

## (undated) DEVICE — DRESSING TRANSPARENT FILM TEGADERM 2.375 X 2.75"  (100EA/BX)"

## (undated) DEVICE — SURGIFOAM (12X7) - (12EA/CA)

## (undated) DEVICE — BOVIE BLADE COATED &INSULATED - 25/PK

## (undated) DEVICE — TIP ASPIRATOR SONOPET IQ STRAIGHT MICRO DIAMETER (4EA/PK)

## (undated) DEVICE — PACK CRANI - (1EA/CA)

## (undated) DEVICE — BLANKET WARMING LOWER BODY (10EA/CA)

## (undated) DEVICE — TOOL MR8 2.3CM F2/7CM TAPER (1/EA)

## (undated) DEVICE — TOOL MR8 9CM ACORN 7.5MM DIAMETER (1/EA)

## (undated) DEVICE — SUTURE GOR-TEX CV-6 TT-9 (36PK/BX)

## (undated) DEVICE — KIT SURGIFLO W/OUT THROMBIN - (6EA/CA)

## (undated) DEVICE — GOWN SURGEONS LARGE - (32/CA)

## (undated) DEVICE — GLOVE BIOGEL PI INDICATOR SZ 6.0 SURGICAL PF LF -(200PR/CA)

## (undated) DEVICE — SUTURE GENERAL

## (undated) DEVICE — DRAPE T CRANIOTOMY W/POUCH - (9/CA)

## (undated) DEVICE — FORCEP BIPOLAR ILLUMINATED DISPOSABLE (5EA/BX)

## (undated) DEVICE — BAG SPONGE COUNT 10.25 X 32 - BLUE (250/CA)

## (undated) DEVICE — SUTURE 4-0 NUROLON CR/8 TF - (12/BX) ETHICON

## (undated) DEVICE — CASSETTE IRRIGATION SONOPET IQ SUCTION (4EA/PK)

## (undated) DEVICE — CORETEMP DRAPE FORM-FITTED EASY DROPANDGO DRAPE FOR USE ON THE CORETEMP FLUID MANAGEMENT 56IN X 56IN

## (undated) DEVICE — GLOVE BIOGEL PI INDICATOR SZ 7.0 SURGICAL PF LF - (50/BX 4BX/CA)

## (undated) DEVICE — DRESSING NON-ADHERING 8 X 3 - (50/BX)

## (undated) DEVICE — SCALP CLIP RANEY 20-1037 (10EA/PK 20PK/CA)

## (undated) DEVICE — BATTERY VARISPEED

## (undated) DEVICE — TUBING CLEARLINK DUO-VENT - C-FLO (48EA/CA)

## (undated) DEVICE — LACTATED RINGERS INJ 1000 ML - (14EA/CA 60CA/PF)

## (undated) DEVICE — CONTAINER SPECIMEN BAG OR - STERILE 4 OZ W/LID (100EA/CA)

## (undated) DEVICE — DRAPE IOBAN II INCISE 23X17 - (10EA/BX 4BX/CA)

## (undated) DEVICE — SET LEADWIRE 5 LEAD BEDSIDE DISPOSABLE ECG (1SET OF 5/EA)

## (undated) DEVICE — CHLORAPREP 26 ML APPLICATOR - ORANGE TINT(25/CA)

## (undated) DEVICE — PIN HEAD MAYFIELD DISP. (3EA/PK 12PK/BX)

## (undated) DEVICE — SODIUM CHL IRRIGATION 0.9% 1000ML (12EA/CA)